# Patient Record
Sex: MALE | Race: WHITE | Employment: OTHER | URBAN - METROPOLITAN AREA
[De-identification: names, ages, dates, MRNs, and addresses within clinical notes are randomized per-mention and may not be internally consistent; named-entity substitution may affect disease eponyms.]

---

## 2017-04-18 ENCOUNTER — APPOINTMENT (OUTPATIENT)
Dept: GENERAL RADIOLOGY | Age: 72
End: 2017-04-18
Attending: EMERGENCY MEDICINE
Payer: MEDICARE

## 2017-04-18 ENCOUNTER — HOSPITAL ENCOUNTER (EMERGENCY)
Age: 72
Discharge: HOME | End: 2017-04-18
Attending: EMERGENCY MEDICINE
Payer: MEDICARE

## 2017-04-18 VITALS
DIASTOLIC BLOOD PRESSURE: 63 MMHG | WEIGHT: 315 LBS | SYSTOLIC BLOOD PRESSURE: 125 MMHG | TEMPERATURE: 98 F | HEART RATE: 77 BPM | OXYGEN SATURATION: 97 % | HEIGHT: 74 IN | BODY MASS INDEX: 40.43 KG/M2 | RESPIRATION RATE: 16 BRPM

## 2017-04-18 DIAGNOSIS — M25.511 PAIN OF BOTH SHOULDER JOINTS: Primary | ICD-10-CM

## 2017-04-18 DIAGNOSIS — M54.2 NECK PAIN: ICD-10-CM

## 2017-04-18 DIAGNOSIS — M25.512 PAIN OF BOTH SHOULDER JOINTS: Primary | ICD-10-CM

## 2017-04-18 LAB
ALBUMIN SERPL BCP-MCNC: 3.4 G/DL (ref 3.2–4.6)
ALBUMIN/GLOB SERPL: 0.9 {RATIO} (ref 1.2–3.5)
ALP SERPL-CCNC: 73 U/L (ref 50–136)
ALT SERPL-CCNC: 19 U/L (ref 12–65)
ANION GAP BLD CALC-SCNC: 8 MMOL/L (ref 7–16)
AST SERPL W P-5'-P-CCNC: 20 U/L (ref 15–37)
ATRIAL RATE: 79 BPM
BASOPHILS # BLD AUTO: 0 K/UL (ref 0–0.2)
BASOPHILS # BLD: 0 % (ref 0–2)
BILIRUB SERPL-MCNC: 0.7 MG/DL (ref 0.2–1.1)
BUN SERPL-MCNC: 9 MG/DL (ref 8–23)
CALCIUM SERPL-MCNC: 9 MG/DL (ref 8.3–10.4)
CALCULATED P AXIS, ECG09: 33 DEGREES
CALCULATED R AXIS, ECG10: 40 DEGREES
CALCULATED T AXIS, ECG11: 69 DEGREES
CHLORIDE SERPL-SCNC: 103 MMOL/L (ref 98–107)
CO2 SERPL-SCNC: 28 MMOL/L (ref 21–32)
CREAT SERPL-MCNC: 0.77 MG/DL (ref 0.8–1.5)
DIAGNOSIS, 93000: NORMAL
DIFFERENTIAL METHOD BLD: ABNORMAL
EOSINOPHIL # BLD: 0.1 K/UL (ref 0–0.8)
EOSINOPHIL NFR BLD: 1 % (ref 0.5–7.8)
ERYTHROCYTE [DISTWIDTH] IN BLOOD BY AUTOMATED COUNT: 13.2 % (ref 11.9–14.6)
GLOBULIN SER CALC-MCNC: 4 G/DL (ref 2.3–3.5)
GLUCOSE SERPL-MCNC: 115 MG/DL (ref 65–100)
HCT VFR BLD AUTO: 42.8 % (ref 41.1–50.3)
HGB BLD-MCNC: 14.5 G/DL (ref 13.6–17.2)
IMM GRANULOCYTES # BLD: 0 K/UL (ref 0–0.5)
IMM GRANULOCYTES NFR BLD AUTO: 0.2 % (ref 0–5)
LYMPHOCYTES # BLD AUTO: 19 % (ref 13–44)
LYMPHOCYTES # BLD: 2 K/UL (ref 0.5–4.6)
MCH RBC QN AUTO: 29.2 PG (ref 26.1–32.9)
MCHC RBC AUTO-ENTMCNC: 33.9 G/DL (ref 31.4–35)
MCV RBC AUTO: 86.1 FL (ref 79.6–97.8)
MONOCYTES # BLD: 1 K/UL (ref 0.1–1.3)
MONOCYTES NFR BLD AUTO: 10 % (ref 4–12)
NEUTS SEG # BLD: 7.2 K/UL (ref 1.7–8.2)
NEUTS SEG NFR BLD AUTO: 70 % (ref 43–78)
P-R INTERVAL, ECG05: 256 MS
PLATELET # BLD AUTO: 186 K/UL (ref 150–450)
PMV BLD AUTO: 9.2 FL (ref 10.8–14.1)
POTASSIUM SERPL-SCNC: 4 MMOL/L (ref 3.5–5.1)
PROT SERPL-MCNC: 7.4 G/DL (ref 6.3–8.2)
Q-T INTERVAL, ECG07: 358 MS
QRS DURATION, ECG06: 76 MS
QTC CALCULATION (BEZET), ECG08: 410 MS
RBC # BLD AUTO: 4.97 M/UL (ref 4.23–5.67)
SODIUM SERPL-SCNC: 139 MMOL/L (ref 136–145)
TROPONIN I SERPL-MCNC: <0.02 NG/ML (ref 0.02–0.05)
VENTRICULAR RATE, ECG03: 79 BPM
WBC # BLD AUTO: 10.3 K/UL (ref 4.3–11.1)

## 2017-04-18 PROCEDURE — 96375 TX/PRO/DX INJ NEW DRUG ADDON: CPT | Performed by: EMERGENCY MEDICINE

## 2017-04-18 PROCEDURE — 96374 THER/PROPH/DIAG INJ IV PUSH: CPT | Performed by: EMERGENCY MEDICINE

## 2017-04-18 PROCEDURE — 74011250636 HC RX REV CODE- 250/636: Performed by: EMERGENCY MEDICINE

## 2017-04-18 PROCEDURE — 84484 ASSAY OF TROPONIN QUANT: CPT | Performed by: EMERGENCY MEDICINE

## 2017-04-18 PROCEDURE — 85025 COMPLETE CBC W/AUTO DIFF WBC: CPT | Performed by: EMERGENCY MEDICINE

## 2017-04-18 PROCEDURE — 99284 EMERGENCY DEPT VISIT MOD MDM: CPT | Performed by: EMERGENCY MEDICINE

## 2017-04-18 PROCEDURE — 73030 X-RAY EXAM OF SHOULDER: CPT

## 2017-04-18 PROCEDURE — 93005 ELECTROCARDIOGRAM TRACING: CPT | Performed by: EMERGENCY MEDICINE

## 2017-04-18 PROCEDURE — 80053 COMPREHEN METABOLIC PANEL: CPT | Performed by: EMERGENCY MEDICINE

## 2017-04-18 PROCEDURE — 71020 XR CHEST PA LAT: CPT

## 2017-04-18 PROCEDURE — 72040 X-RAY EXAM NECK SPINE 2-3 VW: CPT

## 2017-04-18 RX ORDER — HYDROMORPHONE HYDROCHLORIDE 1 MG/ML
1 INJECTION, SOLUTION INTRAMUSCULAR; INTRAVENOUS; SUBCUTANEOUS
Status: COMPLETED | OUTPATIENT
Start: 2017-04-18 | End: 2017-04-18

## 2017-04-18 RX ORDER — METHYLPREDNISOLONE 4 MG/1
TABLET ORAL
Qty: 1 DOSE PACK | Refills: 0 | Status: SHIPPED | OUTPATIENT
Start: 2017-04-18 | End: 2017-09-15

## 2017-04-18 RX ORDER — ONDANSETRON 2 MG/ML
4 INJECTION INTRAMUSCULAR; INTRAVENOUS
Status: COMPLETED | OUTPATIENT
Start: 2017-04-18 | End: 2017-04-18

## 2017-04-18 RX ADMIN — HYDROMORPHONE HYDROCHLORIDE 1 MG: 1 INJECTION, SOLUTION INTRAMUSCULAR; INTRAVENOUS; SUBCUTANEOUS at 12:11

## 2017-04-18 RX ADMIN — ONDANSETRON 4 MG: 2 INJECTION INTRAMUSCULAR; INTRAVENOUS at 12:11

## 2017-04-18 NOTE — ED NOTES
Patient with c/o uncontrolled arthritis pain affecting right shoulder and into right side of neck. Patient reports Hx of injury x2 to right shoulder without open surgical intervention.

## 2017-04-18 NOTE — ED TRIAGE NOTES
Pt reports that he fell Saturday on the cement. Pt states pain to his chest so bad that he almost passed out. Pt states the pain in his chest is gone now.  Pt has extensive heart hx

## 2017-04-18 NOTE — ED PROVIDER NOTES
HPI Comments: Patient complains of severe uncontrolled arthritic pain affecting his right shoulder and right side of the neck. He is currently followed by pain management for not treating his pain. He also notes a fall on Saturday with an injury to his chest hurt a great deal at the time but is now almost gone. Patient is a 67 y.o. male presenting with shoulder pain. The history is provided by the patient and a friend. Shoulder Pain    The incident occurred more than 1 week ago. There was no injury mechanism. Both shoulders are affected. The pain is moderate. The pain has been constant since onset. The pain does not radiate. There is no history of shoulder injury. He has no other injuries. There is no history of shoulder surgery. Pertinent negatives include no numbness, no muscle weakness and no tingling.         Past Medical History:   Diagnosis Date    Anxiety     managed with medication     Arthritis     generalized osteo, managed with medication     Borderline diabetes     lost 100 Lb with no further problems    CAD (coronary artery disease) 2004    states light heart attack    Chronic pain     BACK    COPD     managed with inhalers    Diverticulitis     managed with diet     History of shingles     right side years ago    Hyperlipidemia     managed with medication    Hypertension     managed with medication     Hypertrophy of prostate with urinary obstruction and other lower urinary tract symptoms (LUTS)     Impotence of organic origin     Morbid obesity with BMI of 40.0-44.9, adult ([facility])     BMI 42.2    Nausea & vomiting     after anesthesia    Stenosis of lumbosacral spine     Unspecified adverse effect of anesthesia     difficult to go under anesthesia       Past Surgical History:   Procedure Laterality Date    HX APPENDECTOMY      HX CATARACT REMOVAL Bilateral     with implants    HX COLONOSCOPY      HX FEMUR FRACTURE TX Left     wendi inserted but later removed    HX LUMBAR LAMINECTOMY      HX UROLOGICAL      penile implant    HX UROLOGICAL      penile implant removal    TOTAL KNEE ARTHROPLASTY Bilateral          Family History:   Problem Relation Age of Onset    Cancer Father     Lung Disease Father     Cancer Sister     Heart Disease Mother     Lung Disease Mother     Lung Disease Brother     Lung Disease Sister     Cancer Other     Heart Disease Other      gen fam HX    Hypertension Other      gen fam HX    High Cholesterol Other      gen fam HX       Social History     Social History    Marital status:      Spouse name: N/A    Number of children: N/A    Years of education: N/A     Occupational History    Not on file. Social History Main Topics    Smoking status: Former Smoker     Packs/day: 2.00     Years: 40.00    Smokeless tobacco: Former User      Comment: stopped in [address] Alcohol use No    Drug use: No    Sexual activity: Not on file     Other Topics Concern    Not on file     Social History Narrative         ALLERGIES: Review of patient's allergies indicates no known allergies. Review of Systems   Constitutional: Negative for chills and fever. Cardiovascular: Positive for chest pain. Musculoskeletal: Positive for arthralgias and neck pain. Negative for back pain, myalgias and neck stiffness. Neurological: Negative for tingling and numbness. All other systems reviewed and are negative. Vitals:    04/18/17 1001 04/18/17 1200   BP: 145/74 (!) 163/107   Pulse: 84 72   Resp: 18    Temp: 97.8 °F (36.6 °C)    SpO2: 95% 95%   Weight: 158.8 kg (350 lb)    Height: 6' 2\" (1.88 m)             Physical Exam   Constitutional: He is oriented to person, place, and time. He appears well-developed and well-nourished. No distress. HENT:   Head: Normocephalic and atraumatic.    Right Ear: Tympanic membrane and external ear normal.   Left Ear: Tympanic membrane and external ear normal.   Mouth/Throat: Oropharynx is clear and moist.   Eyes: Conjunctivae and EOM are normal. Pupils are equal, round, and reactive to light. Neck: Trachea normal, normal range of motion and phonation normal. Neck supple. Muscular tenderness present. No spinous process tenderness present. No tracheal deviation and normal range of motion present. Cardiovascular: Normal rate, regular rhythm, normal heart sounds and intact distal pulses. Exam reveals no gallop and no friction rub. No murmur heard. Pulmonary/Chest: Effort normal and breath sounds normal. No respiratory distress. He has no wheezes. Abdominal: Soft. Bowel sounds are normal. He exhibits no distension and no mass. There is no hepatosplenomegaly. There is no tenderness. There is no rebound and no guarding. Musculoskeletal: He exhibits no edema. Right shoulder: He exhibits decreased range of motion, tenderness, crepitus and pain. He exhibits no swelling, no effusion, no deformity, no spasm, normal pulse and normal strength. Left shoulder: He exhibits tenderness, crepitus and pain. He exhibits normal range of motion, no swelling, no effusion, no deformity, no spasm, normal pulse and normal strength. Lymphadenopathy:     He has no cervical adenopathy. Neurological: He is alert and oriented to person, place, and time. He has normal strength. He displays normal reflexes. No cranial nerve deficit or sensory deficit. Coordination normal.   Skin: Skin is warm and dry. No rash noted. He is not diaphoretic. No erythema. Psychiatric: He has a normal mood and affect. Nursing note and vitals reviewed.        MDM  Number of Diagnoses or Management Options  Neck pain: established and worsening  Pain of both shoulder joints: established and worsening     Amount and/or Complexity of Data Reviewed  Clinical lab tests: ordered and reviewed  Tests in the radiology section of CPT®: ordered and reviewed  Decide to obtain previous medical records or to obtain history from someone other than the patient: yes  Review and summarize past medical records: yes  Independent visualization of images, tracings, or specimens: yes    Risk of Complications, Morbidity, and/or Mortality  Presenting problems: high  Diagnostic procedures: moderate  Management options: moderate    Patient Progress  Patient progress: stable    ED Course       Procedures    The patient was observed in the ED. Results Reviewed:      Recent Results (from the past 24 hour(s))   EKG, 12 LEAD, INITIAL    Collection Time: 04/18/17 10:02 AM   Result Value Ref Range    Ventricular Rate 79 BPM    Atrial Rate 79 BPM    P-R Interval 256 ms    QRS Duration 76 ms    Q-T Interval 358 ms    QTC Calculation (Bezet) 410 ms    Calculated P Axis 33 degrees    Calculated R Axis 40 degrees    Calculated T Axis 69 degrees    Diagnosis       Sinus rhythm with 1st degree A-V block  Possible Anterior infarct , age undetermined  Abnormal ECG  When compared with ECG of 08-JUL-2002 15:50,  WV interval has increased  Borderline criteria for Anterior infarct are now Present  Confirmed by THERESA TSE (), Lucas Mandujano ([id number]) on 4/18/2017 12:17:08 PM     TROPONIN I    Collection Time: 04/18/17 10:07 AM   Result Value Ref Range    Troponin-I, Qt. <0.02 (L) 0.02 - 5.63 NG/ML   METABOLIC PANEL, COMPREHENSIVE    Collection Time: 04/18/17 10:07 AM   Result Value Ref Range    Sodium 139 136 - 145 mmol/L    Potassium 4.0 3.5 - 5.1 mmol/L    Chloride 103 98 - 107 mmol/L    CO2 28 21 - 32 mmol/L    Anion gap 8 7 - 16 mmol/L    Glucose 115 (H) 65 - 100 mg/dL    BUN 9 8 - 23 MG/DL    Creatinine 0.77 (L) 0.8 - 1.5 MG/DL    GFR est AA >60 >60 ml/min/1.73m2    GFR est non-AA >60 >60 ml/min/1.73m2    Calcium 9.0 8.3 - 10.4 MG/DL    Bilirubin, total 0.7 0.2 - 1.1 MG/DL    ALT (SGPT) 19 12 - 65 U/L    AST (SGOT) 20 15 - 37 U/L    Alk.  phosphatase 73 50 - 136 U/L    Protein, total 7.4 6.3 - 8.2 g/dL    Albumin 3.4 3.2 - 4.6 g/dL    Globulin 4.0 (H) 2.3 - 3.5 g/dL    A-G Ratio 0.9 (L) 1.2 - 3.5     CBC WITH AUTOMATED DIFF    Collection Time: 04/18/17 10:07 AM   Result Value Ref Range    WBC 10.3 4.3 - 11.1 K/uL    RBC 4.97 4.23 - 5.67 M/uL    HGB 14.5 13.6 - 17.2 g/dL    HCT 42.8 41.1 - 50.3 %    MCV 86.1 79.6 - 97.8 FL    MCH 29.2 26.1 - 32.9 PG    MCHC 33.9 31.4 - 35.0 g/dL    RDW 13.2 11.9 - 14.6 %    PLATELET 794 113 - 974 K/uL    MPV 9.2 (L) 10.8 - 14.1 FL    DF AUTOMATED      NEUTROPHILS 70 43 - 78 %    LYMPHOCYTES 19 13 - 44 %    MONOCYTES 10 4.0 - 12.0 %    EOSINOPHILS 1 0.5 - 7.8 %    BASOPHILS 0 0.0 - 2.0 %    IMMATURE GRANULOCYTES 0.2 0.0 - 5.0 %    ABS. NEUTROPHILS 7.2 1.7 - 8.2 K/UL    ABS. LYMPHOCYTES 2.0 0.5 - 4.6 K/UL    ABS. MONOCYTES 1.0 0.1 - 1.3 K/UL    ABS. EOSINOPHILS 0.1 0.0 - 0.8 K/UL    ABS. BASOPHILS 0.0 0.0 - 0.2 K/UL    ABS. IMM. GRANS. 0.0 0.0 - 0.5 K/UL     XR CHEST PA LAT    (Results Pending)   XR SPINE CERV PA LAT ODONT 3 V MAX    (Results Pending)   XR SHOULDER RT AP/LAT MIN 2 V    (Results Pending)     Preliminary x-ray findings reveal severe DJD with no obvious evidence of fracture or acute process  I discussed the results of all labs, procedures, radiographs, and treatments with the patient and available family. Treatment plan is agreed upon and the patient is ready for discharge. All voiced understanding of the discharge plan and medication instructions or changes as appropriate. Questions about treatment in the ED were answered. All were encouraged to return should symptoms worsen or new problems develop.

## 2017-04-18 NOTE — DISCHARGE INSTRUCTIONS
Shoulder Pain: Care Instructions  Your Care Instructions    You can hurt your shoulder by using it too much during an activity, such as fishing or baseball. It can also happen as part of the everyday wear and tear of getting older. Shoulder injuries can be slow to heal, but your shoulder should get better with time. Your doctor may recommend a sling to rest your shoulder. If you have injured your shoulder, you may need testing and treatment. Follow-up care is a key part of your treatment and safety. Be sure to make and go to all appointments, and call your doctor if you are having problems. It's also a good idea to know your test results and keep a list of the medicines you take. How can you care for yourself at home? · Take pain medicines exactly as directed. ¨ If the doctor gave you a prescription medicine for pain, take it as prescribed. ¨ If you are not taking a prescription pain medicine, ask your doctor if you can take an over-the-counter medicine. ¨ Do not take two or more pain medicines at the same time unless the doctor told you to. Many pain medicines contain acetaminophen, which is Tylenol. Too much acetaminophen (Tylenol) can be harmful. · If your doctor recommends that you wear a sling, use it as directed. Do not take it off before your doctor tells you to. · Put ice or a cold pack on the sore area for 10 to 20 minutes at a time. Put a thin cloth between the ice and your skin. · If there is no swelling, you can put moist heat, a heating pad, or a warm cloth on your shoulder. Some doctors suggest alternating between hot and cold. · Rest your shoulder for a few days. If your doctor recommends it, you can then begin gentle exercise of the shoulder, but do not lift anything heavy. When should you call for help? Call 911 anytime you think you may need emergency care. For example, call if:  · You have chest pain or pressure. This may occur with:  ¨ Sweating. ¨ Shortness of breath.   ¨ Nausea or vomiting. ¨ Pain that spreads from the chest to the neck, jaw, or one or both shoulders or arms. ¨ Dizziness or lightheadedness. ¨ A fast or uneven pulse. After calling 911, chew 1 adult-strength aspirin. Wait for an ambulance. Do not try to drive yourself. · Your arm or hand is cool or pale or changes color. Call your doctor now or seek immediate medical care if:  · You have signs of infection, such as:  ¨ Increased pain, swelling, warmth, or redness in your shoulder. ¨ Red streaks leading from a place on your shoulder. ¨ Pus draining from an area of your shoulder. ¨ Swollen lymph nodes in your neck, armpits, or groin. ¨ A fever. Watch closely for changes in your health, and be sure to contact your doctor if:  · You cannot use your shoulder. · Your shoulder does not get better as expected. Where can you learn more? Go to http://matthewJaspersoftmaame.info/. Enter I803 in the search box to learn more about \"Shoulder Pain: Care Instructions. \"  Current as of: May 23, 2016  Content Version: 11.2  © 9084-8013 [employer]. Care instructions adapted under license by [employer] (which disclaims liability or warranty for this information). If you have questions about a medical condition or this instruction, always ask your healthcare professional. [address] any warranty or liability for your use of this information. Shoulder Stretches: Exercises  Your Care Instructions  Here are some examples of exercises for your shoulder. Start each exercise slowly. Ease off the exercise if you start to have pain. Your doctor or physical therapist will tell you when you can start these exercises and which ones will work best for you. How to do the exercises  Note: These exercises should cause you to feel a gentle stretch, but no pain. Shoulder stretch    1.  a doorway and place one arm against the door frame.  Your elbow should be a little higher than your shoulder. 2. Relax your shoulders as you lean forward, allowing your chest and shoulder muscles to stretch. You can also turn your body slightly away from your arm to stretch the muscles even more. 3. Hold for 15 to 30 seconds. 4. Repeat 2 to 4 times with each arm. Shoulder and chest stretch    Shoulder and chest stretch  1. While sitting, relax your upper body so you slump slightly in your chair. 2. As you breathe in, straighten your back and open your arms out to the sides. 3. Gently pull your shoulder blades back and downward. 4. Hold for 15 to 30 seconds as your breathe normally. 5. Repeat 2 to 4 times. Overhead stretch    1. Reach up over your head with both arms. 2. Hold for 15 to 30 seconds. 3. Repeat 2 to 4 times. Follow-up care is a key part of your treatment and safety. Be sure to make and go to all appointments, and call your doctor if you are having problems. It's also a good idea to know your test results and keep a list of the medicines you take. Where can you learn more? Go to http://matthew-maame.info/. Enter S254 in the search box to learn more about \"Shoulder Stretches: Exercises. \"  Current as of: May 23, 2016  Content Version: 11.2  © 1314-5902 [employer]. Care instructions adapted under license by [employer] (which disclaims liability or warranty for this information). If you have questions about a medical condition or this instruction, always ask your healthcare professional. [place] [address] any warranty or liability for your use of this information.

## 2018-03-05 ENCOUNTER — HOSPITAL ENCOUNTER (OUTPATIENT)
Dept: ULTRASOUND IMAGING | Age: 73
Discharge: HOME | End: 2018-03-05
Attending: UROLOGY
Payer: MEDICARE

## 2018-03-05 DIAGNOSIS — N28.89 LEFT RENAL MASS: ICD-10-CM

## 2018-03-05 PROCEDURE — 76770 US EXAM ABDO BACK WALL COMP: CPT

## 2018-04-12 ENCOUNTER — HOSPITAL ENCOUNTER (OUTPATIENT)
Dept: CT IMAGING | Age: 73
Discharge: HOME | End: 2018-04-12
Attending: UROLOGY
Payer: MEDICARE

## 2018-04-12 DIAGNOSIS — N28.89 RENAL MASS: ICD-10-CM

## 2018-04-12 DIAGNOSIS — C64.2 MALIGNANT NEOPLASM OF LEFT KIDNEY, EXCEPT RENAL PELVIS (HCC): ICD-10-CM

## 2018-04-12 PROCEDURE — 74011636320 HC RX REV CODE- 636/320: Performed by: UROLOGY

## 2018-04-12 PROCEDURE — 74177 CT ABD & PELVIS W/CONTRAST: CPT

## 2018-04-12 PROCEDURE — 74011000258 HC RX REV CODE- 258: Performed by: UROLOGY

## 2018-04-12 RX ORDER — SODIUM CHLORIDE 0.9 % (FLUSH) 0.9 %
10 SYRINGE (ML) INJECTION
Status: COMPLETED | OUTPATIENT
Start: 2018-04-12 | End: 2018-04-12

## 2018-04-12 RX ADMIN — SODIUM CHLORIDE 100 ML: 900 INJECTION, SOLUTION INTRAVENOUS at 16:16

## 2018-04-12 RX ADMIN — DIATRIZOATE MEGLUMINE AND DIATRIZOATE SODIUM 15 ML: 660; 100 LIQUID ORAL; RECTAL at 16:16

## 2018-04-12 RX ADMIN — IOPAMIDOL 100 ML: 755 INJECTION, SOLUTION INTRAVENOUS at 16:16

## 2018-04-12 RX ADMIN — Medication 10 ML: at 16:16

## 2018-04-30 ENCOUNTER — HOSPITAL ENCOUNTER (OUTPATIENT)
Dept: SURGERY | Age: 73
Discharge: HOME | End: 2018-04-30
Payer: MEDICARE

## 2018-04-30 VITALS
DIASTOLIC BLOOD PRESSURE: 93 MMHG | TEMPERATURE: 97.7 F | OXYGEN SATURATION: 90 % | RESPIRATION RATE: 20 BRPM | HEIGHT: 74 IN | WEIGHT: 315 LBS | HEART RATE: 68 BPM | BODY MASS INDEX: 40.43 KG/M2 | SYSTOLIC BLOOD PRESSURE: 150 MMHG

## 2018-04-30 LAB
ANION GAP SERPL CALC-SCNC: 5 MMOL/L (ref 7–16)
BUN SERPL-MCNC: 8 MG/DL (ref 8–23)
CALCIUM SERPL-MCNC: 9.1 MG/DL (ref 8.3–10.4)
CHLORIDE SERPL-SCNC: 105 MMOL/L (ref 98–107)
CO2 SERPL-SCNC: 31 MMOL/L (ref 21–32)
CREAT SERPL-MCNC: 0.74 MG/DL (ref 0.8–1.5)
ERYTHROCYTE [DISTWIDTH] IN BLOOD BY AUTOMATED COUNT: 13.8 % (ref 11.9–14.6)
GLUCOSE SERPL-MCNC: 102 MG/DL (ref 65–100)
HCT VFR BLD AUTO: 44 % (ref 41.1–50.3)
HGB BLD-MCNC: 15.1 G/DL (ref 13.6–17.2)
MCH RBC QN AUTO: 30.6 PG (ref 26.1–32.9)
MCHC RBC AUTO-ENTMCNC: 34.3 G/DL (ref 31.4–35)
MCV RBC AUTO: 89.2 FL (ref 79.6–97.8)
PLATELET # BLD AUTO: 189 K/UL (ref 150–450)
PMV BLD AUTO: 8.9 FL (ref 10.8–14.1)
POTASSIUM SERPL-SCNC: 4.9 MMOL/L (ref 3.5–5.1)
RBC # BLD AUTO: 4.93 M/UL (ref 4.23–5.67)
SODIUM SERPL-SCNC: 141 MMOL/L (ref 136–145)
WBC # BLD AUTO: 5.6 K/UL (ref 4.3–11.1)

## 2018-04-30 PROCEDURE — 80048 BASIC METABOLIC PNL TOTAL CA: CPT | Performed by: UROLOGY

## 2018-04-30 PROCEDURE — 85027 COMPLETE CBC AUTOMATED: CPT | Performed by: UROLOGY

## 2018-04-30 RX ORDER — CEFAZOLIN SODIUM/WATER 2 G/20 ML
2 SYRINGE (ML) INTRAVENOUS ONCE
Status: CANCELLED | OUTPATIENT
Start: 2018-05-07 | End: 2018-05-07

## 2018-04-30 RX ORDER — ALBUTEROL SULFATE 90 UG/1
1 AEROSOL, METERED RESPIRATORY (INHALATION)
COMMUNITY

## 2018-04-30 RX ORDER — DEXTROMETHORPHAN HYDROBROMIDE, GUAIFENESIN 5; 100 MG/5ML; MG/5ML
650 LIQUID ORAL EVERY 8 HOURS
COMMUNITY

## 2018-04-30 RX ORDER — SODIUM CHLORIDE 9 MG/ML
250 INJECTION, SOLUTION INTRAVENOUS AS NEEDED
Status: CANCELLED | OUTPATIENT
Start: 2018-04-30

## 2018-04-30 NOTE — PERIOP NOTES
EKG tracing 3/21/18 not yet received from [facility]. EKG interpretation in \"care everywhere\" 3/21/18 reads Normal Sinus Rhythm, first degree AV block, PVC noted. Re-faxed release and received second confirmation. Spoke with Griffin Washington at [facility], requested EKG tracing to be faxed to [phone]. Per Griffin Washington, Dr Jazzy León must approve before EKG is released. Dr Lennox Means reviewed EKG 4/18/17 and EKG interpretation 3/21/18 and approved. Will continue to pursue EKG tracing. Chart flagged for charge nurse.

## 2018-04-30 NOTE — PERIOP NOTES
Type 3 surgery,  assessment complete. Pt is here with his friend Elvie Gabriel. Pt is hard of hearing and has no family in the state. Labs per surgeon: CBC, BMP today and Pt and friend Dax Navarro understanding pt must return to main lab Sunday 5/6/18 between 8 am and noon for blood bank labs. Pt verbalizes understanding not to remove green blood bank wrist band. Labs per anesthesia protocol: none  EKG: requested EKG/faxed release to primary care dated as 3/21/18. Awaiting response. Pt states hx of murmur at birth and dx again ~2013. Murmur slightly audible on R sternal border with pt leaning forward. Pt states last ECHO ~2013 with dx. Dr Poli Rodríguez gave telephone order for ECHO, however after in to assess states \"no echo required. \"    Hibiclens and instructions given per hospital policy. Patient provided with and instructed on educational handouts including Guide to Surgery, Pain Management, Hand Hygiene, Blood Transfusion Education, and [place] [facility] Brochure. Patient answered medical/surgical history questions at their best of ability. All prior to admission medications documented in [facility]. Patient instructed to hold all vitamins 7 days prior to surgery and NSAIDS 5 days prior to surgery, patient verbalized understanding. Medications to be held: Advil pm    Patient instructed to continue previous medications as prescribed prior to surgery and to take the following medications the day of surgery according to anesthesia guidelines with a small sip of water: finasteride, oxybutynin and Pt verbalizes understanding to bring Proair inhaler. Patient instructed on the following:  Arrive at main Entrance, time of arrival to be called the day before by 1700  Pt has copy of bowel prep instructions from surgeon and verbalizes understanding. Responsible adult must drive patient to the hospital, stay during surgery, and patient will require supervision 24 hours after anesthesia.   Use hibiclens in shower the night before surgery and on the morning of surgery. Leave all valuables (money and jewelry) at home but bring insurance card and ID on DOS. Do not wear make-up, nail polish, lotions, cologne, perfumes, powders, or oil on skin. Patient teach back successful and patient demonstrates knowledge of instruction.

## 2018-05-06 ENCOUNTER — HOSPITAL ENCOUNTER (OUTPATIENT)
Dept: LAB | Age: 73
Discharge: HOME | DRG: 657 | End: 2018-05-06
Payer: MEDICARE

## 2018-05-06 ENCOUNTER — ANESTHESIA EVENT (OUTPATIENT)
Dept: SURGERY | Age: 73
DRG: 657 | End: 2018-05-06
Payer: MEDICARE

## 2018-05-06 PROCEDURE — 86850 RBC ANTIBODY SCREEN: CPT | Performed by: UROLOGY

## 2018-05-06 PROCEDURE — 36415 COLL VENOUS BLD VENIPUNCTURE: CPT | Performed by: UROLOGY

## 2018-05-06 PROCEDURE — 86920 COMPATIBILITY TEST SPIN: CPT | Performed by: UROLOGY

## 2018-05-07 ENCOUNTER — ANESTHESIA (OUTPATIENT)
Dept: SURGERY | Age: 73
DRG: 657 | End: 2018-05-07
Payer: MEDICARE

## 2018-05-07 ENCOUNTER — HOSPITAL ENCOUNTER (INPATIENT)
Age: 73
LOS: 5 days | Discharge: NURSING, REHAB OR LONG-TERM CARE | DRG: 657 | End: 2018-05-12
Attending: UROLOGY | Admitting: UROLOGY
Payer: MEDICARE

## 2018-05-07 DIAGNOSIS — C64.2 CARCINOMA, RENAL CELL, LEFT (HCC): Primary | ICD-10-CM

## 2018-05-07 PROBLEM — N28.89 LEFT RENAL MASS: Status: ACTIVE | Noted: 2018-05-07

## 2018-05-07 LAB — GLUCOSE BLD STRIP.AUTO-MCNC: 119 MG/DL (ref 65–100)

## 2018-05-07 PROCEDURE — 82962 GLUCOSE BLOOD TEST: CPT

## 2018-05-07 PROCEDURE — 77030034154 HC SHR COAG HARM ACE J&J -F: Performed by: UROLOGY

## 2018-05-07 PROCEDURE — 77030035051: Performed by: UROLOGY

## 2018-05-07 PROCEDURE — 74011250637 HC RX REV CODE- 250/637: Performed by: ANESTHESIOLOGY

## 2018-05-07 PROCEDURE — 74011000250 HC RX REV CODE- 250

## 2018-05-07 PROCEDURE — 76010000177 HC OR TIME 5 TO 5.5 HR INTENSV-TIER 1: Performed by: UROLOGY

## 2018-05-07 PROCEDURE — 77030031139 HC SUT VCRL2 J&J -A: Performed by: UROLOGY

## 2018-05-07 PROCEDURE — 77030008522 HC TBNG INSUF LAPRO STRY -B: Performed by: UROLOGY

## 2018-05-07 PROCEDURE — 77030020268 HC MISC GENERAL SUPPLY: Performed by: UROLOGY

## 2018-05-07 PROCEDURE — 77030035045 HC TRCR ENDOSC VRSPRT BLDLSS COVD -B: Performed by: UROLOGY

## 2018-05-07 PROCEDURE — 74011250636 HC RX REV CODE- 250/636: Performed by: UROLOGY

## 2018-05-07 PROCEDURE — 74011000258 HC RX REV CODE- 258: Performed by: UROLOGY

## 2018-05-07 PROCEDURE — 74011250636 HC RX REV CODE- 250/636

## 2018-05-07 PROCEDURE — 77030022473 HC HNDL ENDO GIA UNIV USDA -C: Performed by: UROLOGY

## 2018-05-07 PROCEDURE — 77030008756 HC TU IRR SUC STRY -B: Performed by: UROLOGY

## 2018-05-07 PROCEDURE — 77030025088 HC APPL CLP LIG 1 COVD -B: Performed by: UROLOGY

## 2018-05-07 PROCEDURE — 76210000016 HC OR PH I REC 1 TO 1.5 HR: Performed by: UROLOGY

## 2018-05-07 PROCEDURE — 74011000250 HC RX REV CODE- 250: Performed by: ANESTHESIOLOGY

## 2018-05-07 PROCEDURE — C1726 CATH, BAL DIL, NON-VASCULAR: HCPCS | Performed by: UROLOGY

## 2018-05-07 PROCEDURE — 74011250636 HC RX REV CODE- 250/636: Performed by: ANESTHESIOLOGY

## 2018-05-07 PROCEDURE — 0TT14ZZ RESECTION OF LEFT KIDNEY, PERCUTANEOUS ENDOSCOPIC APPROACH: ICD-10-PCS | Performed by: UROLOGY

## 2018-05-07 PROCEDURE — 74011250637 HC RX REV CODE- 250/637: Performed by: UROLOGY

## 2018-05-07 PROCEDURE — 77030005518 HC CATH URETH FOL 2W BARD -B: Performed by: UROLOGY

## 2018-05-07 PROCEDURE — 77030011640 HC PAD GRND REM COVD -A: Performed by: UROLOGY

## 2018-05-07 PROCEDURE — 77030008477 HC STYL SATN SLP COVD -A: Performed by: ANESTHESIOLOGY

## 2018-05-07 PROCEDURE — 77030008606 HC TRCR ENDOSC KII AMR -B: Performed by: UROLOGY

## 2018-05-07 PROCEDURE — 77030032490 HC SLV COMPR SCD KNE COVD -B: Performed by: UROLOGY

## 2018-05-07 PROCEDURE — 76060000041 HC ANESTHESIA 5 TO 5.5 HR: Performed by: UROLOGY

## 2018-05-07 PROCEDURE — 77030003575 HC NDL INSUF ENDOPTH J&J -B: Performed by: UROLOGY

## 2018-05-07 PROCEDURE — 77030008603 HC TRCR ENDOSC EPATH J&J -C: Performed by: UROLOGY

## 2018-05-07 PROCEDURE — 77030000038 HC TIP SCIS LAPSCP SURI -B: Performed by: UROLOGY

## 2018-05-07 PROCEDURE — 77030020782 HC GWN BAIR PAWS FLX 3M -B: Performed by: ANESTHESIOLOGY

## 2018-05-07 PROCEDURE — 77030009965 HC RELD STPLR ENDOS COVD -D: Performed by: UROLOGY

## 2018-05-07 PROCEDURE — 65270000029 HC RM PRIVATE

## 2018-05-07 PROCEDURE — 77030019940 HC BLNKT HYPOTHRM STRY -B: Performed by: ANESTHESIOLOGY

## 2018-05-07 PROCEDURE — 77030002966 HC SUT PDS J&J -A: Performed by: UROLOGY

## 2018-05-07 PROCEDURE — 77030019908 HC STETH ESOPH SIMS -A: Performed by: ANESTHESIOLOGY

## 2018-05-07 PROCEDURE — 77030016151 HC PROTCTR LNS DFOG COVD -B: Performed by: UROLOGY

## 2018-05-07 PROCEDURE — 77030034696 HC CATH URETH FOL 2W BARD -A: Performed by: UROLOGY

## 2018-05-07 PROCEDURE — 74011000250 HC RX REV CODE- 250: Performed by: UROLOGY

## 2018-05-07 PROCEDURE — 77030008703 HC TU ET UNCUF COVD -A: Performed by: ANESTHESIOLOGY

## 2018-05-07 PROCEDURE — 77030018836 HC SOL IRR NACL ICUM -A: Performed by: UROLOGY

## 2018-05-07 PROCEDURE — 77030007956 HC PCH ENDOSC SPEC COVD -C: Performed by: UROLOGY

## 2018-05-07 PROCEDURE — 77030022474 HC RELD STPLR ENDO GIA COVD -C: Performed by: UROLOGY

## 2018-05-07 PROCEDURE — 77030008467 HC STPLR SKN COVD -B: Performed by: UROLOGY

## 2018-05-07 PROCEDURE — 88307 TISSUE EXAM BY PATHOLOGIST: CPT | Performed by: UROLOGY

## 2018-05-07 RX ORDER — PROPOFOL 10 MG/ML
INJECTION, EMULSION INTRAVENOUS AS NEEDED
Status: DISCONTINUED | OUTPATIENT
Start: 2018-05-07 | End: 2018-05-07 | Stop reason: HOSPADM

## 2018-05-07 RX ORDER — ONDANSETRON 2 MG/ML
4 INJECTION INTRAMUSCULAR; INTRAVENOUS
Status: DISCONTINUED | OUTPATIENT
Start: 2018-05-07 | End: 2018-05-12 | Stop reason: HOSPADM

## 2018-05-07 RX ORDER — GLYCOPYRROLATE 0.2 MG/ML
INJECTION INTRAMUSCULAR; INTRAVENOUS AS NEEDED
Status: DISCONTINUED | OUTPATIENT
Start: 2018-05-07 | End: 2018-05-07 | Stop reason: HOSPADM

## 2018-05-07 RX ORDER — MIDAZOLAM HYDROCHLORIDE 1 MG/ML
2 INJECTION, SOLUTION INTRAMUSCULAR; INTRAVENOUS
Status: DISCONTINUED | OUTPATIENT
Start: 2018-05-07 | End: 2018-05-07 | Stop reason: HOSPADM

## 2018-05-07 RX ORDER — NALOXONE HYDROCHLORIDE 0.4 MG/ML
0.1 INJECTION, SOLUTION INTRAMUSCULAR; INTRAVENOUS; SUBCUTANEOUS
Status: DISCONTINUED | OUTPATIENT
Start: 2018-05-07 | End: 2018-05-07 | Stop reason: HOSPADM

## 2018-05-07 RX ORDER — HYDROMORPHONE HYDROCHLORIDE 2 MG/ML
1 INJECTION, SOLUTION INTRAMUSCULAR; INTRAVENOUS; SUBCUTANEOUS
Status: DISCONTINUED | OUTPATIENT
Start: 2018-05-07 | End: 2018-05-12 | Stop reason: HOSPADM

## 2018-05-07 RX ORDER — ACETAMINOPHEN 325 MG/1
650 TABLET ORAL
Status: DISCONTINUED | OUTPATIENT
Start: 2018-05-07 | End: 2018-05-12 | Stop reason: HOSPADM

## 2018-05-07 RX ORDER — LIDOCAINE HYDROCHLORIDE 20 MG/ML
INJECTION, SOLUTION EPIDURAL; INFILTRATION; INTRACAUDAL; PERINEURAL AS NEEDED
Status: DISCONTINUED | OUTPATIENT
Start: 2018-05-07 | End: 2018-05-07 | Stop reason: HOSPADM

## 2018-05-07 RX ORDER — DEXTROSE MONOHYDRATE AND SODIUM CHLORIDE 5; .45 G/100ML; G/100ML
75 INJECTION, SOLUTION INTRAVENOUS CONTINUOUS
Status: DISCONTINUED | OUTPATIENT
Start: 2018-05-07 | End: 2018-05-10

## 2018-05-07 RX ORDER — SODIUM CHLORIDE 0.9 % (FLUSH) 0.9 %
5-10 SYRINGE (ML) INJECTION EVERY 8 HOURS
Status: DISCONTINUED | OUTPATIENT
Start: 2018-05-07 | End: 2018-05-07 | Stop reason: HOSPADM

## 2018-05-07 RX ORDER — SODIUM CHLORIDE, SODIUM LACTATE, POTASSIUM CHLORIDE, CALCIUM CHLORIDE 600; 310; 30; 20 MG/100ML; MG/100ML; MG/100ML; MG/100ML
75 INJECTION, SOLUTION INTRAVENOUS CONTINUOUS
Status: DISCONTINUED | OUTPATIENT
Start: 2018-05-07 | End: 2018-05-07 | Stop reason: HOSPADM

## 2018-05-07 RX ORDER — SODIUM CHLORIDE 0.9 % (FLUSH) 0.9 %
5-10 SYRINGE (ML) INJECTION AS NEEDED
Status: DISCONTINUED | OUTPATIENT
Start: 2018-05-07 | End: 2018-05-07 | Stop reason: HOSPADM

## 2018-05-07 RX ORDER — BUPIVACAINE HYDROCHLORIDE AND EPINEPHRINE 5; 5 MG/ML; UG/ML
INJECTION, SOLUTION EPIDURAL; INTRACAUDAL; PERINEURAL AS NEEDED
Status: DISCONTINUED | OUTPATIENT
Start: 2018-05-07 | End: 2018-05-07 | Stop reason: HOSPADM

## 2018-05-07 RX ORDER — SODIUM CHLORIDE 0.9 % (FLUSH) 0.9 %
5-10 SYRINGE (ML) INJECTION AS NEEDED
Status: DISCONTINUED | OUTPATIENT
Start: 2018-05-07 | End: 2018-05-12 | Stop reason: HOSPADM

## 2018-05-07 RX ORDER — GABAPENTIN 300 MG/1
300 CAPSULE ORAL ONCE
Status: COMPLETED | OUTPATIENT
Start: 2018-05-07 | End: 2018-05-07

## 2018-05-07 RX ORDER — OXYCODONE HYDROCHLORIDE 5 MG/1
5 TABLET ORAL
Status: COMPLETED | OUTPATIENT
Start: 2018-05-07 | End: 2018-05-07

## 2018-05-07 RX ORDER — ROCURONIUM BROMIDE 10 MG/ML
INJECTION, SOLUTION INTRAVENOUS AS NEEDED
Status: DISCONTINUED | OUTPATIENT
Start: 2018-05-07 | End: 2018-05-07 | Stop reason: HOSPADM

## 2018-05-07 RX ORDER — POLYVINYL ALCOHOL 14 MG/ML
1 SOLUTION/ DROPS OPHTHALMIC AS NEEDED
Status: DISCONTINUED | OUTPATIENT
Start: 2018-05-07 | End: 2018-05-12 | Stop reason: HOSPADM

## 2018-05-07 RX ORDER — FENTANYL CITRATE 50 UG/ML
INJECTION, SOLUTION INTRAMUSCULAR; INTRAVENOUS AS NEEDED
Status: DISCONTINUED | OUTPATIENT
Start: 2018-05-07 | End: 2018-05-07 | Stop reason: HOSPADM

## 2018-05-07 RX ORDER — NEOSTIGMINE METHYLSULFATE 1 MG/ML
INJECTION INTRAVENOUS AS NEEDED
Status: DISCONTINUED | OUTPATIENT
Start: 2018-05-07 | End: 2018-05-07 | Stop reason: HOSPADM

## 2018-05-07 RX ORDER — NALBUPHINE HYDROCHLORIDE 20 MG/ML
5 INJECTION, SOLUTION INTRAMUSCULAR; INTRAVENOUS; SUBCUTANEOUS
Status: DISCONTINUED | OUTPATIENT
Start: 2018-05-07 | End: 2018-05-07 | Stop reason: HOSPADM

## 2018-05-07 RX ORDER — SUCCINYLCHOLINE CHLORIDE 20 MG/ML
INJECTION INTRAMUSCULAR; INTRAVENOUS AS NEEDED
Status: DISCONTINUED | OUTPATIENT
Start: 2018-05-07 | End: 2018-05-07 | Stop reason: HOSPADM

## 2018-05-07 RX ORDER — FLUMAZENIL 0.1 MG/ML
0.2 INJECTION INTRAVENOUS
Status: DISCONTINUED | OUTPATIENT
Start: 2018-05-07 | End: 2018-05-07 | Stop reason: HOSPADM

## 2018-05-07 RX ORDER — SODIUM CHLORIDE 9 MG/ML
250 INJECTION, SOLUTION INTRAVENOUS AS NEEDED
Status: DISCONTINUED | OUTPATIENT
Start: 2018-05-07 | End: 2018-05-07 | Stop reason: HOSPADM

## 2018-05-07 RX ORDER — SODIUM CHLORIDE 0.9 % (FLUSH) 0.9 %
5-10 SYRINGE (ML) INJECTION EVERY 8 HOURS
Status: DISCONTINUED | OUTPATIENT
Start: 2018-05-07 | End: 2018-05-12 | Stop reason: HOSPADM

## 2018-05-07 RX ORDER — HYDROMORPHONE HYDROCHLORIDE 2 MG/ML
0.5 INJECTION, SOLUTION INTRAMUSCULAR; INTRAVENOUS; SUBCUTANEOUS
Status: COMPLETED | OUTPATIENT
Start: 2018-05-07 | End: 2018-05-07

## 2018-05-07 RX ORDER — KETAMINE HYDROCHLORIDE 100 MG/ML
INJECTION, SOLUTION INTRAMUSCULAR; INTRAVENOUS AS NEEDED
Status: DISCONTINUED | OUTPATIENT
Start: 2018-05-07 | End: 2018-05-07 | Stop reason: HOSPADM

## 2018-05-07 RX ORDER — AMITRIPTYLINE HYDROCHLORIDE 50 MG/1
25 TABLET, FILM COATED ORAL
Status: DISCONTINUED | OUTPATIENT
Start: 2018-05-07 | End: 2018-05-12 | Stop reason: HOSPADM

## 2018-05-07 RX ORDER — SODIUM CHLORIDE, SODIUM LACTATE, POTASSIUM CHLORIDE, CALCIUM CHLORIDE 600; 310; 30; 20 MG/100ML; MG/100ML; MG/100ML; MG/100ML
100 INJECTION, SOLUTION INTRAVENOUS CONTINUOUS
Status: DISCONTINUED | OUTPATIENT
Start: 2018-05-07 | End: 2018-05-07 | Stop reason: HOSPADM

## 2018-05-07 RX ORDER — ALBUTEROL SULFATE 90 UG/1
1 AEROSOL, METERED RESPIRATORY (INHALATION)
Status: DISCONTINUED | OUTPATIENT
Start: 2018-05-07 | End: 2018-05-12 | Stop reason: HOSPADM

## 2018-05-07 RX ORDER — ONDANSETRON 2 MG/ML
INJECTION INTRAMUSCULAR; INTRAVENOUS AS NEEDED
Status: DISCONTINUED | OUTPATIENT
Start: 2018-05-07 | End: 2018-05-07 | Stop reason: HOSPADM

## 2018-05-07 RX ORDER — LIDOCAINE HYDROCHLORIDE 10 MG/ML
0.1 INJECTION INFILTRATION; PERINEURAL AS NEEDED
Status: DISCONTINUED | OUTPATIENT
Start: 2018-05-07 | End: 2018-05-07 | Stop reason: HOSPADM

## 2018-05-07 RX ORDER — FINASTERIDE 5 MG/1
5 TABLET, FILM COATED ORAL DAILY
Status: DISCONTINUED | OUTPATIENT
Start: 2018-05-08 | End: 2018-05-12 | Stop reason: HOSPADM

## 2018-05-07 RX ORDER — DEXAMETHASONE SODIUM PHOSPHATE 4 MG/ML
INJECTION, SOLUTION INTRA-ARTICULAR; INTRALESIONAL; INTRAMUSCULAR; INTRAVENOUS; SOFT TISSUE AS NEEDED
Status: DISCONTINUED | OUTPATIENT
Start: 2018-05-07 | End: 2018-05-07 | Stop reason: HOSPADM

## 2018-05-07 RX ORDER — SODIUM CHLORIDE, SODIUM LACTATE, POTASSIUM CHLORIDE, CALCIUM CHLORIDE 600; 310; 30; 20 MG/100ML; MG/100ML; MG/100ML; MG/100ML
INJECTION, SOLUTION INTRAVENOUS
Status: DISCONTINUED | OUTPATIENT
Start: 2018-05-07 | End: 2018-05-07 | Stop reason: HOSPADM

## 2018-05-07 RX ORDER — HYDROCODONE BITARTRATE AND ACETAMINOPHEN 10; 325 MG/1; MG/1
1 TABLET ORAL
Status: DISCONTINUED | OUTPATIENT
Start: 2018-05-07 | End: 2018-05-12 | Stop reason: HOSPADM

## 2018-05-07 RX ORDER — VECURONIUM BROMIDE FOR INJECTION 1 MG/ML
INJECTION, POWDER, LYOPHILIZED, FOR SOLUTION INTRAVENOUS AS NEEDED
Status: DISCONTINUED | OUTPATIENT
Start: 2018-05-07 | End: 2018-05-07 | Stop reason: HOSPADM

## 2018-05-07 RX ORDER — DOCUSATE SODIUM 100 MG/1
100 CAPSULE, LIQUID FILLED ORAL 2 TIMES DAILY
Status: DISCONTINUED | OUTPATIENT
Start: 2018-05-07 | End: 2018-05-12 | Stop reason: HOSPADM

## 2018-05-07 RX ORDER — ACETAMINOPHEN 10 MG/ML
1000 INJECTION, SOLUTION INTRAVENOUS ONCE
Status: COMPLETED | OUTPATIENT
Start: 2018-05-07 | End: 2018-05-07

## 2018-05-07 RX ORDER — LIDOCAINE HYDROCHLORIDE ANHYDROUS AND DEXTROSE MONOHYDRATE .4; 5 G/100ML; G/100ML
INJECTION, SOLUTION INTRAVENOUS
Status: DISCONTINUED | OUTPATIENT
Start: 2018-05-07 | End: 2018-05-07 | Stop reason: HOSPADM

## 2018-05-07 RX ADMIN — VECURONIUM BROMIDE FOR INJECTION 2 MG: 1 INJECTION, POWDER, LYOPHILIZED, FOR SOLUTION INTRAVENOUS at 09:49

## 2018-05-07 RX ADMIN — KETAMINE HYDROCHLORIDE 50 MG: 100 INJECTION, SOLUTION INTRAMUSCULAR; INTRAVENOUS at 08:29

## 2018-05-07 RX ADMIN — SODIUM CHLORIDE, SODIUM LACTATE, POTASSIUM CHLORIDE, CALCIUM CHLORIDE: 600; 310; 30; 20 INJECTION, SOLUTION INTRAVENOUS at 10:15

## 2018-05-07 RX ADMIN — FAMOTIDINE 20 MG: 10 INJECTION, SOLUTION INTRAVENOUS at 06:59

## 2018-05-07 RX ADMIN — SODIUM CHLORIDE, SODIUM LACTATE, POTASSIUM CHLORIDE, AND CALCIUM CHLORIDE 100 ML/HR: 600; 310; 30; 20 INJECTION, SOLUTION INTRAVENOUS at 06:38

## 2018-05-07 RX ADMIN — Medication 10 ML: at 21:39

## 2018-05-07 RX ADMIN — HYDROMORPHONE HYDROCHLORIDE 0.5 MG: 2 INJECTION, SOLUTION INTRAMUSCULAR; INTRAVENOUS; SUBCUTANEOUS at 13:48

## 2018-05-07 RX ADMIN — FENTANYL CITRATE 25 MCG: 50 INJECTION, SOLUTION INTRAMUSCULAR; INTRAVENOUS at 12:54

## 2018-05-07 RX ADMIN — ROCURONIUM BROMIDE 20 MG: 10 INJECTION, SOLUTION INTRAVENOUS at 08:25

## 2018-05-07 RX ADMIN — ONDANSETRON 4 MG: 2 INJECTION INTRAMUSCULAR; INTRAVENOUS at 12:40

## 2018-05-07 RX ADMIN — SUCCINYLCHOLINE CHLORIDE 200 MG: 20 INJECTION INTRAMUSCULAR; INTRAVENOUS at 07:54

## 2018-05-07 RX ADMIN — HYDROMORPHONE HYDROCHLORIDE 0.5 MG: 2 INJECTION, SOLUTION INTRAMUSCULAR; INTRAVENOUS; SUBCUTANEOUS at 13:39

## 2018-05-07 RX ADMIN — HYDROMORPHONE HYDROCHLORIDE 1 MG: 2 INJECTION, SOLUTION INTRAMUSCULAR; INTRAVENOUS; SUBCUTANEOUS at 22:29

## 2018-05-07 RX ADMIN — Medication 3 G: at 08:13

## 2018-05-07 RX ADMIN — AMITRIPTYLINE HYDROCHLORIDE 25 MG: 50 TABLET, FILM COATED ORAL at 21:31

## 2018-05-07 RX ADMIN — GABAPENTIN 300 MG: 300 CAPSULE ORAL at 07:05

## 2018-05-07 RX ADMIN — LIDOCAINE HYDROCHLORIDE ANHYDROUS AND DEXTROSE MONOHYDRATE 1.5 MG/MIN: .4; 5 INJECTION, SOLUTION INTRAVENOUS at 08:07

## 2018-05-07 RX ADMIN — DOCUSATE SODIUM 100 MG: 100 CAPSULE, LIQUID FILLED ORAL at 16:57

## 2018-05-07 RX ADMIN — FENTANYL CITRATE 25 MCG: 50 INJECTION, SOLUTION INTRAMUSCULAR; INTRAVENOUS at 12:57

## 2018-05-07 RX ADMIN — GLYCOPYRROLATE 0.4 MG: 0.2 INJECTION INTRAMUSCULAR; INTRAVENOUS at 12:54

## 2018-05-07 RX ADMIN — PROPOFOL 120 MG: 10 INJECTION, EMULSION INTRAVENOUS at 07:52

## 2018-05-07 RX ADMIN — FENTANYL CITRATE 100 MCG: 50 INJECTION, SOLUTION INTRAMUSCULAR; INTRAVENOUS at 07:50

## 2018-05-07 RX ADMIN — Medication 3 G: at 12:13

## 2018-05-07 RX ADMIN — OXYCODONE HYDROCHLORIDE 5 MG: 5 TABLET ORAL at 14:04

## 2018-05-07 RX ADMIN — HYDROMORPHONE HYDROCHLORIDE 1 MG: 2 INJECTION, SOLUTION INTRAMUSCULAR; INTRAVENOUS; SUBCUTANEOUS at 18:35

## 2018-05-07 RX ADMIN — Medication 10 ML: at 15:00

## 2018-05-07 RX ADMIN — ROCURONIUM BROMIDE 25 MG: 10 INJECTION, SOLUTION INTRAVENOUS at 08:10

## 2018-05-07 RX ADMIN — ROCURONIUM BROMIDE 5 MG: 10 INJECTION, SOLUTION INTRAVENOUS at 07:52

## 2018-05-07 RX ADMIN — HYDROMORPHONE HYDROCHLORIDE 0.5 MG: 2 INJECTION, SOLUTION INTRAMUSCULAR; INTRAVENOUS; SUBCUTANEOUS at 13:32

## 2018-05-07 RX ADMIN — DEXAMETHASONE SODIUM PHOSPHATE 10 MG: 4 INJECTION, SOLUTION INTRA-ARTICULAR; INTRALESIONAL; INTRAMUSCULAR; INTRAVENOUS; SOFT TISSUE at 08:43

## 2018-05-07 RX ADMIN — VECURONIUM BROMIDE FOR INJECTION 2 MG: 1 INJECTION, POWDER, LYOPHILIZED, FOR SOLUTION INTRAVENOUS at 09:01

## 2018-05-07 RX ADMIN — DEXTROSE MONOHYDRATE AND SODIUM CHLORIDE 100 ML/HR: 5; .45 INJECTION, SOLUTION INTRAVENOUS at 15:00

## 2018-05-07 RX ADMIN — VECURONIUM BROMIDE FOR INJECTION 1 MG: 1 INJECTION, POWDER, LYOPHILIZED, FOR SOLUTION INTRAVENOUS at 12:26

## 2018-05-07 RX ADMIN — VECURONIUM BROMIDE FOR INJECTION 2 MG: 1 INJECTION, POWDER, LYOPHILIZED, FOR SOLUTION INTRAVENOUS at 10:34

## 2018-05-07 RX ADMIN — HYDROMORPHONE HYDROCHLORIDE 0.5 MG: 2 INJECTION, SOLUTION INTRAMUSCULAR; INTRAVENOUS; SUBCUTANEOUS at 13:20

## 2018-05-07 RX ADMIN — HYDROCODONE BITARTRATE AND ACETAMINOPHEN 1 TABLET: 10; 325 TABLET ORAL at 16:56

## 2018-05-07 RX ADMIN — SODIUM CHLORIDE, SODIUM LACTATE, POTASSIUM CHLORIDE, AND CALCIUM CHLORIDE: 600; 310; 30; 20 INJECTION, SOLUTION INTRAVENOUS at 12:11

## 2018-05-07 RX ADMIN — LIDOCAINE HYDROCHLORIDE 100 MG: 20 INJECTION, SOLUTION EPIDURAL; INFILTRATION; INTRACAUDAL; PERINEURAL at 07:51

## 2018-05-07 RX ADMIN — PROPOFOL 50 MG: 10 INJECTION, EMULSION INTRAVENOUS at 07:58

## 2018-05-07 RX ADMIN — NEOSTIGMINE METHYLSULFATE 4 MG: 1 INJECTION INTRAVENOUS at 12:54

## 2018-05-07 RX ADMIN — ACETAMINOPHEN 1000 MG: 10 INJECTION, SOLUTION INTRAVENOUS at 13:15

## 2018-05-07 RX ADMIN — VECURONIUM BROMIDE FOR INJECTION 1 MG: 1 INJECTION, POWDER, LYOPHILIZED, FOR SOLUTION INTRAVENOUS at 11:30

## 2018-05-07 RX ADMIN — FENTANYL CITRATE 50 MCG: 50 INJECTION, SOLUTION INTRAMUSCULAR; INTRAVENOUS at 12:55

## 2018-05-07 RX ADMIN — SODIUM CHLORIDE, SODIUM LACTATE, POTASSIUM CHLORIDE, CALCIUM CHLORIDE: 600; 310; 30; 20 INJECTION, SOLUTION INTRAVENOUS at 08:05

## 2018-05-07 NOTE — BRIEF OP NOTE
BRIEF OPERATIVE NOTE    Date of Procedure: 5/7/2018   Preoperative Diagnosis: Left renal mass [N28.89]  Postoperative Diagnosis: Left renal mass [N28.89]    Procedure(s):  LEFT NEPHRECTOMY LAPAROSCOPIC  Surgeon(s) and Role:     * Reinier Pulido MD - Primary     * Buck Trejo MD - Assisting         Surgical Assistant: Jess Hammans    Surgical Staff:  Circ-1: Leena Shah RN  Circ-Relief: Marilyn Gooden RN  Scrub Tech-1: [place] Gould  Scrub Tech-Relief: Kirstin Garcia  Event Time In   Incision Start 0830   Incision Close 1250     Anesthesia: General   Estimated Blood Loss: 40 ml  Specimens:   ID Type Source Tests Collected by Time Destination   1 : Left Kidney Fresh Kidney  Reinier Pulido MD 5/7/2018 1246 Pathology      Findings: see dictation   Complications: none apparent  Implants: * No implants in log *

## 2018-05-07 NOTE — IP AVS SNAPSHOT
[facility] 
 
 
 145 [facility] 322 [facility] 
[phone] Patient: Conrad Kraft MRN: [id number] FARHAN:[birth year] About your hospitalization You were admitted on: May 7, 2018 You last received care in the:  [facility] 6 MED SURG You were discharged on:  May 12, 2018 Why you were hospitalized Your primary diagnosis was:  Left Renal Mass Your diagnoses also included:  Carcinoma, Renal Cell, Left (Hcc) Follow-up Information Follow up With Details Comments Contact Info Roverto Causey MD    [address] [facility] [address] 
[phone] Terrie Headley MD  Office will call with a follow up appt. [address] [address] 
[phone] Your Scheduled Appointments Friday May 18, 2018  1:30 PM EDT Office Visit with Terrie Headley MD  
[facility] Urology 48 ([address]) 1441 [facility] [address] [place] [address]  
[phone] Discharge Orders None A check anmol indicates which time of day the medication should be taken. My Medications START taking these medications Instructions Each Dose to Equal  
 Morning Noon Evening Bedtime HYDROcodone-acetaminophen  mg tablet Commonly known as:  Gleda Ridgel Take 1 Tab by mouth every six (6) hours as needed. Max Daily Amount: 4 Tabs. 1 Tab CONTINUE taking these medications Instructions Each Dose to Equal  
 Morning Noon Evening Bedtime  
 acetaminophen 650 mg Tber Commonly known as:  TYLENOL Notes to Patient:  Resume home schedule Take 650 mg by mouth every eight (8) hours. 650 mg ADVIL -38 mg Tab Generic drug:  Ibuprofen-diphenhydrAMINE Take  by mouth. amitriptyline 25 mg tablet Commonly known as:  ELAVIL Your last dose was: Last night at bedtime Your next dose is: Tonight at bedtime Take  by mouth nightly. finasteride 5 mg tablet Commonly known as:  PROSCAR Your last dose was: This morning Your next dose is:  Tomorrow Morning Take 1 Tab by mouth daily. 5 mg LUBRICANT EYE DROPS (GLYC-PG) OP Notes to Patient:  Resume home schedule Apply 1 Drop to eye two (2) times a day. 1 Drop PROAIR HFA 90 mcg/actuation inhaler Generic drug:  albuterol Take 1 Puff by inhalation every six (6) hours as needed for Wheezing or Shortness of Breath (\"seldom uses\" per pt). 1 Puff STOP taking these medications   
 oxybutynin 5 mg tablet Commonly known as:  EQIERGFP Where to Get Your Medications Information on where to get these meds will be given to you by the nurse or doctor. ! Ask your nurse or doctor about these medications HYDROcodone-acetaminophen  mg tablet Opioid Education Prescription Opioids: What You Need to Know: 
 
Prescription opioids can be used to help relieve moderate-to-severe pain and are often prescribed following a surgery or injury, or for certain health conditions. These medications can be an important part of treatment but also come with serious risks. Opioids are strong pain medicines. Examples include hydrocodone, oxycodone, fentanyl, and morphine. Heroin is an example of an illegal opioid. It is important to work with your health care provider to make sure you are getting the safest, most effective care. WHAT ARE THE RISKS AND SIDE EFFECTS OF OPIOID USE? Prescription opioids carry serious risks of addiction and overdose, especially with prolonged use. An opioid overdose, often marked by slow breathing, can cause sudden death. The use of prescription opioids can have a number of side effects as well, even when taken as directed. · Tolerance-meaning you might need to take more of a medication for the same pain relief · Physical dependence-meaning you have symptoms of withdrawal when the medication is stopped. Withdrawal symptoms can include nausea, sweating, chills, diarrhea, stomach cramps, and muscle aches. Withdrawal can last up to several weeks, depending on which drug you took and how long you took it. · Increased sensitivity to pain · Constipation · Nausea, vomiting, and dry mouth · Sleepiness and dizziness · Confusion · Depression · Low levels of testosterone that can result in lower sex drive, energy, and strength · Itching and sweating RISKS ARE GREATER WITH:      
· History of drug misuse, substance use disorder, or overdose · Mental health conditions (such as depression or anxiety) · Sleep apnea · Older age (72 years or older) · Pregnancy Avoid alcohol while taking prescription opioids. Also, unless specifically advised by your health care provider, medications to avoid include: · Benzodiazepines (such as Xanax or Valium) · Muscle relaxants (such as Soma or Flexeril) · Hypnotics (such as Ambien or Lunesta) · Other prescription opioids KNOW YOUR OPTIONS Talk to your health care provider about ways to manage your pain that don't involve prescription opioids. Some of these options may actually work better and have fewer risks and side effects. Options may include: 
· Pain relievers such as acetaminophen, ibuprofen, and naproxen · Some medications that are also used for depression or seizures · Physical therapy and exercise · Counseling to help patients learn how to cope better with triggers of pain and stress. · Application of heat or cold compress · Massage therapy · Relaxation techniques Be Informed Make sure you know the name of your medication, how much and how often to take it, and its potential risks & side effects.  
 
IF YOU ARE PRESCRIBED OPIOIDS FOR PAIN: 
 · Never take opioids in greater amounts or more often than prescribed. Remember the goal is not to be pain-free but to manage your pain at a tolerable level. · Follow up with your primary care provider to: · Work together to create a plan on how to manage your pain. · Talk about ways to help manage your pain that don't involve prescription opioids. · Talk about any and all concerns and side effects. · Help prevent misuse and abuse. · Never sell or share prescription opioids · Help prevent misuse and abuse. · Store prescription opioids in a secure place and out of reach of others (this may include visitors, children, friends, and family). · Safely dispose of unused/unwanted prescription opioids: Find your community drug take-back program or your pharmacy mail-back program, or flush them down the toilet, following guidance from the Food and Drug Administration (www.fda.gov/Drugs/ResourcesForYou). · Visit www.cdc.gov/drugoverdose to learn about the risks of opioid abuse and overdose. · If you believe you may be struggling with addiction, tell your health care provider and ask for guidance or call [address] [place], [place] [address]Lexplique - /l?k â€¢ splik/ at 5-065-025-QHLA. Discharge Instructions DISCHARGE SUMMARY from Nurse PATIENT INSTRUCTIONS: 
 
 
F-face looks uneven A-arms unable to move or move unevenly S-speech slurred or non-existent T-time-call 911 as soon as signs and symptoms begin-DO NOT go Back to bed or wait to see if you get better-TIME IS BRAIN. Warning Signs of HEART ATTACK Call 911 if you have these symptoms: 
? Chest discomfort. Most heart attacks involve discomfort in the center of the chest that lasts more than a few minutes, or that goes away and comes back. It can feel like uncomfortable pressure, squeezing, fullness, or pain. ? Discomfort in other areas of the upper body. Symptoms can include pain or discomfort in one or both arms, the back, neck, jaw, or stomach. ? Shortness of breath with or without chest discomfort. ? Other signs may include breaking out in a cold sweat, nausea, or lightheadedness. Don't wait more than five minutes to call [address]! Fast action can save your life. Calling 911 is almost always the fastest way to get lifesaving treatment. Emergency Medical Services staff can begin treatment when they arrive  up to an hour sooner than if someone gets to the hospital by car. The discharge information has been reviewed with the patient. The patient verbalized understanding. Discharge medications reviewed with the patient and appropriate educational materials and side effects teaching were provided. ___________________________________________________________________________________________________________________________________ Open Nephrectomy: What to Expect at [facility] Your Recovery A nephrectomy is surgery to take out part or all of the kidney. One or both kidneys may have been taken out. Sometimes other tissue near the kidney is taken out at the same time. The doctor took out your kidney through a long cut, called an incision, in the front or side of your belly. The incision will leave a scar that will fade with time. Your belly will feel sore after the surgery. This usually lasts about 1 to 2 weeks. Your doctor will give you pain medicine for this. You may also have other symptoms such as nausea, diarrhea, constipation, gas, or a headache. At first, you may have low energy and get tired quickly. It may take 3 to 6 months for your energy to fully return. Your body can work fine with one healthy kidney. If both kidneys are removed or your remaining kidney is not healthy, your doctor will talk to you about the kind of treatment you will need after surgery. This care sheet gives you a general idea about how long it will take for you to recover. But each person recovers at a different pace. Follow the steps below to get better as quickly as possible. How can you care for yourself at home? Activity ? · Rest when you feel tired. Getting enough sleep will help you recover. ? · Try to walk each day. Start by walking a little more than you did the day before. Bit by bit, increase the amount you walk. Walking boosts blood flow and helps prevent pneumonia and constipation. ? · Avoid exercises that use your belly muscles and strenuous activities, such as bicycle riding, jogging, weight lifting, or aerobic exercise, until your doctor says it is okay. ? · For at least 4 weeks, avoid lifting anything that would make you strain. This may include a child, heavy grocery bags and milk containers, a heavy briefcase or backpack, cat litter or dog food bags, or a vacuum . ? · Hold a pillow over the cut the doctor made (incision) when you cough or take deep breaths. This will support your belly and decrease your pain. ? · Do breathing exercises at home as instructed by your doctor. This will help prevent pneumonia. ? · Ask your doctor when you can drive again. ? · You will probably need to take 4 to 6 weeks off from work. It depends on the type of work you do and how you feel. ? · You may be able to take showers (unless you have a drainage tube near your incision). If you have a drainage tube, follow your doctor's instructions to empty and care for it. Do not take a bath for the first 2 weeks, or until your doctor tells you it is okay. ? · Ask your doctor when it is okay for you to have sex. Diet ? · You can eat your normal diet. If you were on a special diet for your kidneys before surgery, follow that diet until your doctor tells you to stop. ? · If your stomach is upset, try bland, low-fat foods like plain rice, broiled chicken, toast, and yogurt. ? · Drink plenty of fluids (unless your doctor tells you not to). ? · You may notice that your bowel movements are not regular right after your surgery. This is common. Try to avoid constipation and straining with bowel movements. You may want to take a fiber supplement every day. If you have not had a bowel movement after a couple of days, ask your doctor about taking a mild laxative. Medicines ? · Your doctor will tell you if and when you can restart your medicines. He or she will also give you instructions about taking any new medicines. ? · If you take blood thinners, such as warfarin (Coumadin), clopidogrel (Plavix), or aspirin, be sure to talk to your doctor. He or she will tell you if and when to start taking those medicines again. Make sure that you understand exactly what your doctor wants you to do. ? · Take pain medicines exactly as directed. ¨ If the doctor gave you a prescription medicine for pain, take it as prescribed. ¨ If you are not taking a prescription pain medicine, take an over-the-counter medicine that your doctor recommends. Read and follow all instructions on the label. ¨ Do not take aspirin, ibuprofen (Advil, Motrin), naproxen (Aleve), or other nonsteroidal anti-inflammatory drugs (NSAIDs) unless your doctor says it is okay. ? · If you think your pain medicine is making you sick to your stomach: 
¨ Take your medicine after meals (unless your doctor has told you not to). ¨ Ask your doctor for a different pain medicine. ? · If your doctor prescribed antibiotics, take them as directed. Do not stop taking them just because you feel better. You need to take the full course of antibiotics. Incision care ? · If you have strips of tape on the incision, leave the tape on for a week or until it falls off.  
? · Wash the area daily with warm, soapy water and pat it dry. Don't use hydrogen peroxide or alcohol, which can slow healing.  You may cover the area with a gauze bandage if it weeps or rubs against clothing. Change the bandage every day. ? · Keep the area clean and dry. Follow-up care is a key part of your treatment and safety. Be sure to make and go to all appointments, and call your doctor if you are having problems. It's also a good idea to know your test results and keep a list of the medicines you take. When should you call for help? Call 911 anytime you think you may need emergency care. For example, call if: 
? · You passed out (lost consciousness). ? · You have chest pain, are short of breath, or cough up blood. ?Call your doctor now or seek immediate medical care if: 
? · You have pain that does not get better after you take your pain medicine. ? · You have symptoms of a urinary tract infection. These may include: 
¨ Pain or burning when you urinate. ¨ A frequent need to urinate without being able to pass much urine. ¨ Pain in the flank, which is just below the rib cage and above the waist on either side of the back. ¨ Blood in the urine. ¨ A fever. ? · You have signs of infection, such as: 
¨ Increased pain, swelling, warmth, or redness. ¨ Red streaks leading from the incisions. ¨ Pus draining from the incisions. ¨ A fever. ? · You have loose stitches, or your incisions come open. ? · You are bleeding from the incisions. ? · You cannot urinate. ? · You are sick to your stomach or cannot drink fluids. ? · You have signs of a blood clot in your leg (called a deep vein thrombosis), such as: 
¨ Pain in the calf, back of the knee, thigh, or groin. ¨ Redness and swelling in your leg. ? Watch closely for changes in your health, and be sure to contact your doctor if you are having any problems. Where can you learn more? Go to http://matthew-maame.info/. Enter D636 in the search box to learn more about \"Open Nephrectomy: What to Expect at Home. \" Current as of: May 12, 2017 Content Version: 11.4 © 3973-4909 Healthwise, Incorporated. Care instructions adapted under license by [employer] (which disclaims liability or warranty for this information). If you have questions about a medical condition or this instruction, always ask your healthcare professional. [address] any warranty or liability for your use of this information. H2Mob Announcement We are excited to announce that we are making your provider's discharge notes available to you in H2Mob. You will see these notes when they are completed and signed by the physician that discharged you from your recent hospital stay. If you have any questions or concerns about any information you see in H2Mob, please call the Health Information Department where you were seen or reach out to your Primary Care Provider for more information about your plan of care. Introducing [facility]! [address] introduces H2Mob patient portal. Now you can access parts of your medical record, email your doctor's office, and request medication refills online. 1. In your internet browser, go to [email or web address] 2. Click on the First Time User? Click Here link in the Sign In box. You will see the New Member Sign Up page. 3. Enter your H2Mob Access Code exactly as it appears below. You will not need to use this code after youve completed the sign-up process. If you do not sign up before the expiration date, you must request a new code. · H2Mob Access Code: [email or web address] Expires: 7/25/2018  9:43 AM 
 
4. Enter the last four digits of your Social Security Number (xxxx) and Date of Birth (mm/dd/yyyy) as indicated and click Submit. You will be taken to the next sign-up page. 5. Create a H2Mob ID. This will be your H2Mob login ID and cannot be changed, so think of one that is secure and easy to remember. 6. Create a Tivix password. You can change your password at any time. 7. Enter your Password Reset Question and Answer. This can be used at a later time if you forget your password. 8. Enter your e-mail address. You will receive e-mail notification when new information is available in [address] 9. Click Sign Up. You can now view and download portions of your medical record. 10. Click the Download Summary menu link to download a portable copy of your medical information. If you have questions, please visit the Frequently Asked Questions section of the Tivix website. Remember, Tivix is NOT to be used for urgent needs. For medical emergencies, dial 911. Now available from your iPhone and Android! Introducing Jamal Early As a Axel Barkleys patient, I wanted to make you aware of our electronic visit tool called Jamal Early. [employer] 24/Cryptmint allows you to connect within minutes with a medical provider 24 hours a day, seven days a week via a mobile device or tablet or logging into a secure website from your computer. You can access Jamal Early from anywhere in the [place]. A virtual visit might be right for you when you have a simple condition and feel like you just dont want to get out of bed, or cant get away from work for an appointment, when your regular [facility] provider is not available (evenings, weekends or holidays), or when youre out of town and need minor care. Electronic visits cost only $49 and if the [facility] Curtume ErÃª/Cryptmint provider determines a prescription is needed to treat your condition, one can be electronically transmitted to a nearby pharmacy*. Please take a moment to enroll today if you have not already done so. The enrollment process is free and takes just a few minutes. To enroll, please download the [employer]/Cryptmint chris to your tablet or phone, or visit [email or web address] to enroll on your computer. And, as an [address] [place] [address] patient with a [employer] Secure account, the results of your visits will be scanned into your electronic medical record and your primary care provider will be able to view the scanned results. We urge you to continue to see your regular Odanah Lessen provider for your ongoing medical care. And while your primary care provider may not be the one available when you seek a [employer] Hatjodifin virtual visit, the peace of mind you get from getting a real diagnosis real time can be priceless. For more information on Consulted, view our Frequently Asked Questions (FAQs) at [email or web address]. Sincerely, 
 
Junaid Vieira MD 
Chief Medical Officer [address] *:  certain medications cannot be prescribed via Consulted Providers Seen During Your Hospitalization Provider Specialty Primary office phone Vic Harrison MD Urology [phone] Immunizations Administered for This Admission Name Date  
 TB Skin Test (PPD) Intradermal  Deferred (),  Deferred (), 5/9/2018 Your Primary Care Physician (PCP) Primary Care Physician Office Phone Office Fax Paulie Hadley [phone] You are allergic to the following No active allergies Recent Documentation Height Weight BMI Smoking Status 1.88 m 149.1 kg 42.22 kg/m2 Former Smoker Emergency Contacts Name Discharge Info Relation Home Work Mobile [facility] DISCHARGE CAREGIVER [3] Friend [5] [phone] Patient Belongings The following personal items are in your possession at time of discharge: 
  Dental Appliances: Lowers, Uppers  Visual Aid: Glasses      Home Medications: None   Jewelry: None  Clothing: Footwear, Undergarments, Pants, Shirt    Other Valuables: None Please provide this summary of care documentation to your next provider. Signatures-by signing, you are acknowledging that this After Visit Summary has been reviewed with you and you have received a copy. Patient Signature:  ____________________________________________________________ Date:  ____________________________________________________________  
  
Rexann Ports Provider Signature:  ____________________________________________________________ Date:  ____________________________________________________________

## 2018-05-07 NOTE — ANESTHESIA PREPROCEDURE EVALUATION
Anesthetic History               Review of Systems / Medical History  Patient summary reviewed and pertinent labs reviewed    Pulmonary    COPD: moderate               Neuro/Psych   Within defined limits           Cardiovascular    Hypertension: well controlled          CAD    Exercise tolerance: <4 METS: Limited by back pain and morbid obesity and not SOB  Comments: Patient states MI in '04 - no intervention. EKG showing 1st degree AV block.    GI/Hepatic/Renal  Within defined limits              Endo/Other        Morbid obesity and arthritis     Other Findings            Physical Exam    Airway  Mallampati: II  TM Distance: 4 - 6 cm  Neck ROM: normal range of motion   Mouth opening: Normal     Cardiovascular    Rhythm: regular  Rate: normal         Dental    Dentition: Edentulous     Pulmonary      Decreased breath sounds: bilateral           Abdominal         Other Findings            Anesthetic Plan    ASA: 3  Anesthesia type: general          Induction: Intravenous  Anesthetic plan and risks discussed with: Patient

## 2018-05-07 NOTE — OP NOTES
[facility] REPORT    Bozena Mathew  MR#: [id number]  : 1945  ACCOUNT #: [de-identified]   DATE OF SERVICE: 2018    SURGEON:  Romy Hays MD     ASSISTANT: Kassy Vidal     PREOPERATIVE DIAGNOSIS:  A 4.3 cm left enhancing renal mass. POSTOPERATIVE DIAGNOSIS:  A 4.3 cm left enhancing renal mass. PROCEDURE PERFORMED:  Left laparoscopic nephrectomy. ANESTHESIA:  General.    ESTIMATED BLOOD LOSS:  40 mL    SPECIMENS REMOVED: l kidney     COMPLICATIONS:  None apparent. IMPLANTS: none     INDICATIONS:  This is a morbidly obese, large 76-year-old gentleman with several-year history of a left renal mass that had been followed with surveillance and had continued to enlarge and after discussing the risks versus benefits after his most recent CAT scan from 2018 he decided to move forward with the above-named procedure. FINDINGS:  The case was more difficult than usual due to the patient's large size and his morbid obesity and took longer than a typical laparoscopic nephrectomy with more risk, but was finished without any known complication. TECHNIQUE:  The patient was taken to the operating room and placed in supine position. Anesthesia was induced via anesthesiology service. Orogastric tube and Aguilar catheter was placed. He was placed on a 45-degree bump, left side up, all pressure points adequately padded and then prepped and draped in sterile surgical fashion with abdomen in the sterile field. A small incision was made just above the umbilicus and a Veress needle was used to insufflate the peritoneal cavity. A 12 mm port was placed here under direct vision and pan laparoscopy revealed no pathology. I placed two more 12 mm ports for a total of three 12 mm ports and began by dividing the white line of Toldt and reflecting the colon and colonic fat medially.   There was several more centimeters of fat here than typical and this portion of the procedure went extremely slowly. I was able to identify the ureter and the gonadal vein distally and then began to follow them in a caudal to cranial fashion, identifying the retroperitoneum. Eventually, the gonadal vein dipped down towards the junction of the left renal vein and the vena cava and the gonadal vein was stapled using Endo-ABNER stapler. I continued more cranially until the hilar vessels were identified. The artery and vein were stapled in one staple load of a 6 cm long Endo-ABNER stapler from [employer], which was a beige load. I then continued more superiorly, and the most taxing portion of the procedure was actually division of the fat and connections of the kidney to the most superior portion of the peritoneum and retroperitoneum. The adrenal gland was left in place during this division. The lateral attachments were taken and eventually the ureter itself was divided using clips until the kidney was free of all attachments. We then placed it in an ectopic location, dropped the pressure to 5, inspected the entire field for hemostasis, which had been obtained, placed the kidney back in its orthotopic location, entered the peritoneal cavity in an EndoCatch 2 bag through the supraumbilical port site and placed the kidney within the bag and brought the edges of the bag out through the skin incision. A Antwan-Chito suture passer device was used to place a Vicryl stitch around the fascia of the other two 12 mm ports, closing the fascia and we then made the incision at the extraction site around the left side of the umbilicus and then inferiorly, just large enough to deliver the kidney within the bag outside of the body. The fascia of this extraction site was closed using a #1 PDS on a CT1 needle in an interrupted fashion, followed by closure of the skin at all sites using stapler. This concluded today's procedure. The patient was taken to PACU in stable condition.       Renetta Neves MD SHIRIN / ROBERT  D: 05/07/2018 13:15     T: 05/07/2018 17:52  JOB #: [id number]

## 2018-05-07 NOTE — PERIOP NOTES
TRANSFER - OUT REPORT:    Verbal report given to Shashank Romero RN(name) on Vern Bailon  being transferred to [address] [place] [address] for routine post - op       Report consisted of patients Situation, Background, Assessment and   Recommendations(SBAR). Information from the following report(s) SBAR, Kardex, OR Summary, Procedure Summary, Intake/Output and MAR was reviewed with the receiving nurse. Lines:   Peripheral IV 05/07/18 Left Hand (Active)   Site Assessment Clean, dry, & intact 5/7/2018  2:12 PM   Phlebitis Assessment 0 5/7/2018  2:12 PM   Infiltration Assessment 0 5/7/2018  2:12 PM   Dressing Status Clean, dry, & intact 5/7/2018  2:12 PM   Dressing Type Transparent 5/7/2018  2:12 PM   Hub Color/Line Status Green; Infusing 5/7/2018  2:12 PM   Alcohol Cap Used No 5/7/2018  2:12 PM       Peripheral IV 05/07/18 Right Wrist (Active)   Site Assessment Clean, dry, & intact 5/7/2018  2:12 PM   Phlebitis Assessment 0 5/7/2018  2:12 PM   Infiltration Assessment 0 5/7/2018  2:12 PM   Dressing Status Clean, dry, & intact 5/7/2018  2:12 PM   Dressing Type Transparent 5/7/2018  2:12 PM   Hub Color/Line Status Infusing;Gray 5/7/2018  2:12 PM   Alcohol Cap Used No 5/7/2018  2:12 PM        Opportunity for questions and clarification was provided. Patient transported with:   O2 @ 3 liters    VTE prophylaxis orders have been written for Vern Bailon. Patient and family given floor number and nurses name. Family updated re: pt status after security code verified.

## 2018-05-07 NOTE — PROGRESS NOTES
Hourly rounds performed, all needs met. Pain medication given per MAR. Will monitor pt until nightshift, RN arrives.

## 2018-05-07 NOTE — PROGRESS NOTES
TRANSFER - IN REPORT:    Verbal report received from [facility], RN(name) on Duard Leak  being received from [employer]) for routine post - op      Report consisted of patients Situation, Background, Assessment and   Recommendations(SBAR). Information from the following report(s) SBAR was reviewed with the receiving nurse. Opportunity for questions and clarification was provided. Assessment completed upon patients arrival to unit and care assumed.

## 2018-05-07 NOTE — PROGRESS NOTES
Dual skin assessment formed with Cinda Melendrez RN. Impairment noted:  Pt had L nephrectomy lap X4 sites to L abd (drsg c,d,i), bilateral knee scars, minimal redness to buttocks, scab noted to L arm.

## 2018-05-07 NOTE — ANESTHESIA POSTPROCEDURE EVALUATION
Post-Anesthesia Evaluation and Assessment    Patient: Amy Reis MRN: [id number]  SSN: [id number]    YOB: 1945  Age: 75 y.o. Sex: male       Cardiovascular Function/Vital Signs  Visit Vitals    /79    Pulse 74    Temp 36.5 °C (97.7 °F)    Resp 18    Ht 6' 2\" (1.88 m)    Wt 146.5 kg (323 lb)    SpO2 95%    BMI 41.47 kg/m2       Patient is status post general anesthesia for Procedure(s):  LEFT NEPHRECTOMY LAPAROSCOPIC. Nausea/Vomiting: None    Postoperative hydration reviewed and adequate. Pain:  Pain Scale 1: Visual (05/07/18 1404)  Pain Intensity 1: 10 (05/07/18 1404)   Managed    Neurological Status:   Neuro (WDL): Exceptions to WDL (05/07/18 1311)  Neuro  Neurologic State: Drowsy (05/07/18 1311)  Orientation Level: Oriented to person (05/07/18 1311)  Cognition: Decreased attention/concentration;Poor safety awareness (05/07/18 1311)  Speech:  (mumbles) (05/07/18 1311)  LUE Motor Response: Purposeful (05/07/18 1311)  LLE Motor Response: Purposeful (05/07/18 1311)  RUE Motor Response: Purposeful (05/07/18 1311)  RLE Motor Response: Purposeful (05/07/18 1311)   At baseline    Mental Status and Level of Consciousness: Arousable    Pulmonary Status:   O2 Device: Nasal cannula (Simultaneous filing. User may not have seen previous data.) (05/07/18 1343)   Adequate oxygenation and airway patent    Complications related to anesthesia: None    Post-anesthesia assessment completed.  No concerns    Signed By: Kalin Hooper MD     May 7, 2018

## 2018-05-08 LAB
ANION GAP SERPL CALC-SCNC: 9 MMOL/L (ref 7–16)
BUN SERPL-MCNC: 12 MG/DL (ref 8–23)
CALCIUM SERPL-MCNC: 8.5 MG/DL (ref 8.3–10.4)
CHLORIDE SERPL-SCNC: 103 MMOL/L (ref 98–107)
CO2 SERPL-SCNC: 27 MMOL/L (ref 21–32)
CREAT SERPL-MCNC: 1.33 MG/DL (ref 0.8–1.5)
ERYTHROCYTE [DISTWIDTH] IN BLOOD BY AUTOMATED COUNT: 13.5 % (ref 11.9–14.6)
GLUCOSE SERPL-MCNC: 123 MG/DL (ref 65–100)
HCT VFR BLD AUTO: 41.8 % (ref 41.1–50.3)
HGB BLD-MCNC: 14.3 G/DL (ref 13.6–17.2)
MCH RBC QN AUTO: 30.4 PG (ref 26.1–32.9)
MCHC RBC AUTO-ENTMCNC: 34.2 G/DL (ref 31.4–35)
MCV RBC AUTO: 88.7 FL (ref 79.6–97.8)
PLATELET # BLD AUTO: 204 K/UL (ref 150–450)
PMV BLD AUTO: 9.3 FL (ref 10.8–14.1)
POTASSIUM SERPL-SCNC: 4.5 MMOL/L (ref 3.5–5.1)
RBC # BLD AUTO: 4.71 M/UL (ref 4.23–5.67)
SODIUM SERPL-SCNC: 139 MMOL/L (ref 136–145)
WBC # BLD AUTO: 12.8 K/UL (ref 4.3–11.1)

## 2018-05-08 PROCEDURE — 74011000258 HC RX REV CODE- 258: Performed by: UROLOGY

## 2018-05-08 PROCEDURE — 74011250636 HC RX REV CODE- 250/636: Performed by: UROLOGY

## 2018-05-08 PROCEDURE — 80048 BASIC METABOLIC PNL TOTAL CA: CPT | Performed by: UROLOGY

## 2018-05-08 PROCEDURE — 97530 THERAPEUTIC ACTIVITIES: CPT

## 2018-05-08 PROCEDURE — 85027 COMPLETE CBC AUTOMATED: CPT | Performed by: UROLOGY

## 2018-05-08 PROCEDURE — 97161 PT EVAL LOW COMPLEX 20 MIN: CPT

## 2018-05-08 PROCEDURE — 74011250637 HC RX REV CODE- 250/637: Performed by: UROLOGY

## 2018-05-08 PROCEDURE — 97166 OT EVAL MOD COMPLEX 45 MIN: CPT

## 2018-05-08 PROCEDURE — 65270000029 HC RM PRIVATE

## 2018-05-08 PROCEDURE — 77030027138 HC INCENT SPIROMETER -A

## 2018-05-08 PROCEDURE — 74011250637 HC RX REV CODE- 250/637: Performed by: NURSE PRACTITIONER

## 2018-05-08 PROCEDURE — 36415 COLL VENOUS BLD VENIPUNCTURE: CPT | Performed by: UROLOGY

## 2018-05-08 RX ORDER — FACIAL-BODY WIPES
10 EACH TOPICAL ONCE
Status: ACTIVE | OUTPATIENT
Start: 2018-05-08 | End: 2018-05-08

## 2018-05-08 RX ADMIN — HYDROCODONE BITARTRATE AND ACETAMINOPHEN 1 TABLET: 10; 325 TABLET ORAL at 19:23

## 2018-05-08 RX ADMIN — DEXTROSE MONOHYDRATE AND SODIUM CHLORIDE 100 ML/HR: 5; .45 INJECTION, SOLUTION INTRAVENOUS at 21:10

## 2018-05-08 RX ADMIN — DEXTROSE MONOHYDRATE AND SODIUM CHLORIDE 100 ML/HR: 5; .45 INJECTION, SOLUTION INTRAVENOUS at 13:00

## 2018-05-08 RX ADMIN — Medication 10 ML: at 14:29

## 2018-05-08 RX ADMIN — AMITRIPTYLINE HYDROCHLORIDE 25 MG: 50 TABLET, FILM COATED ORAL at 21:07

## 2018-05-08 RX ADMIN — HYDROMORPHONE HYDROCHLORIDE 1 MG: 2 INJECTION, SOLUTION INTRAMUSCULAR; INTRAVENOUS; SUBCUTANEOUS at 16:02

## 2018-05-08 RX ADMIN — DOCUSATE SODIUM 100 MG: 100 CAPSULE, LIQUID FILLED ORAL at 08:45

## 2018-05-08 RX ADMIN — HYDROCODONE BITARTRATE AND ACETAMINOPHEN 1 TABLET: 10; 325 TABLET ORAL at 08:45

## 2018-05-08 RX ADMIN — HYDROMORPHONE HYDROCHLORIDE 1 MG: 2 INJECTION, SOLUTION INTRAMUSCULAR; INTRAVENOUS; SUBCUTANEOUS at 05:51

## 2018-05-08 RX ADMIN — Medication 10 ML: at 05:18

## 2018-05-08 RX ADMIN — FINASTERIDE 5 MG: 5 TABLET, FILM COATED ORAL at 08:45

## 2018-05-08 RX ADMIN — DEXTROSE MONOHYDRATE AND SODIUM CHLORIDE 100 ML/HR: 5; .45 INJECTION, SOLUTION INTRAVENOUS at 00:30

## 2018-05-08 RX ADMIN — Medication 10 ML: at 21:08

## 2018-05-08 RX ADMIN — DOCUSATE SODIUM 100 MG: 100 CAPSULE, LIQUID FILLED ORAL at 17:22

## 2018-05-08 RX ADMIN — HYDROCODONE BITARTRATE AND ACETAMINOPHEN 1 TABLET: 10; 325 TABLET ORAL at 00:26

## 2018-05-08 NOTE — PROGRESS NOTES
Problem: Self Care Deficits Care Plan (Adult)  Goal: *Acute Goals and Plan of Care (Insert Text)  1. Patient will complete lower body bathing and dressing with minimal assistance and adaptive equipment as needed. 2. Patient will complete toileting with minimal assistance. 3. Patient will tolerate 25 minutes of OT treatment with 2-3 rest breaks to increase activity tolerance for ADLs. 4. Patient will complete functional transfers with supervision and adaptive equipment as needed. 5. Patient will demonstrate ability to complete log roll technique with supine to sit with supervision to decrease pain in abdomen with bed mobility. Timeframe: 7 visits       OCCUPATIONAL THERAPY: Initial Assessment, Treatment Day: 1st and AM 5/8/2018  INPATIENT: Hospital Day: 2  [place] / [place] / Product Type: Medicare /      NAME/AGE/GENDER: Gary Milan is a 75 y.o. male   PRIMARY DIAGNOSIS:  Left renal mass [N28.89] Left renal mass Left renal mass  Procedure(s) (LRB):  LEFT NEPHRECTOMY LAPAROSCOPIC (Left)  1 Day Post-Op  ICD-10: Treatment Diagnosis:    · Generalized Muscle Weakness (M62.81)  · Other lack of cordination (R27.8)   Precautions/Allergies:     Review of patient's allergies indicates no known allergies. ASSESSMENT:     Mr. Dread Velasquez presents to the hospital with L renal mass and is s/p L nephrectomy. Pt was supine in bed upon arrival with friend at bedside. Pt is hard of hearing but does have hearing aids in both ears. Pt reports he lives alone and is typically independent. Pt doesn't have any equipment at home. Pt verbalizes 8/10 pain in the abdomen (pt just received pain medication). Pt is anxious and needs education on therapy and why it is important to participate in mobility after surgery. Pt needed some encouragement to participate. Pt initially wanted to pull up into sitting from supine, but pt educated on log roll technique to decrease pain levels.  Pt with significant pain with bed mobility and somewhat impulsive edge of bed. Pt scooting very close to the edge of the bed. Pt reports 10/10 pain and states he feels like he has to urinate and that this is painful too. Pt educated about the méndez catheter. Pt demonstrates decreased safety awareness pulling up onto the walker into standing with minimal to moderate assistance needed. Pt completed transfer to the chair with minimal assistance. Pt unable to tolerate further activity due to pain levels. Pt limited in B shoulders (R more limited than L) and states he has hx of arthritis and RC tears. Pt left sitting up in chair with all needs at bedside. Pt wants to go home at discharge, stating he has a neighbor he can call if he needs anything. Pt limited by pain and needs encouragement to move. Pt is currently functioning below baseline and will benefit from OT services to address stated goals and plan of care. This section established at most recent assessment   PROBLEM LIST (Impairments causing functional limitations):  1. Decreased Strength  2. Decreased ADL/Functional Activities  3. Decreased Transfer Abilities  4. Decreased Ambulation Ability/Technique  5. Decreased Balance  6. Increased Pain  7. Decreased Activity Tolerance  8. Decreased Flexibility/Joint Mobility  9. Edema/Girth  10. Decreased Knowledge of Precautions  11. Decreased [place] with Home Exercise Program  12. Decreased Cognition   INTERVENTIONS PLANNED: (Benefits and precautions of occupational therapy have been discussed with the patient.)  1. Activities of daily living training  2. Adaptive equipment training  3. Balance training  4. Clothing management  5. Cognitive training  6. Donning&doffing training  7. Neuromuscular re-eduation  8. Therapeutic activity  9. Therapeutic exercise     TREATMENT PLAN: Frequency/Duration: Follow patient 3 times per week to address above goals.   Rehabilitation Potential For Stated Goals: Good     RECOMMENDED REHABILITATION/EQUIPMENT: (at time of discharge pending progress): Due to the probability of continued deficits (see above) this patient will likely need continued skilled occupational therapy after discharge. Equipment:    TBD              OCCUPATIONAL PROFILE AND HISTORY:   History of Present Injury/Illness (Reason for Referral):  See H&P  Past Medical History/Comorbidities:   Mr. Yvrose Chavira  has a past medical history of Adverse effect of anesthesia; Anxiety; Arthritis; Borderline diabetes; CAD (coronary artery disease) (2004); Chronic pain; COPD; Depression; Diverticulitis; Former smoker, stopped smoking in distant past; Heart murmur; History of shingles; [place] (hard of hearing); Hyperlipidemia; Hypertension; Hypertrophy of prostate with urinary obstruction and other lower urinary tract symptoms (LUTS); Impotence of organic origin; Insomnia; Left renal mass; Morbid obesity with BMI of 40.0-44.9, adult ([facility].) (04/30/2018); PONV (postoperative nausea and vomiting); Stenosis of lumbosacral spine; and Unspecified adverse effect of anesthesia. Mr. Yvrose Chavira  has a past surgical history that includes hx colonoscopy; hx femur fracture tx (Left, 1974, 1975); hx urological; hx urological; pr total knee arthroplasty (Bilateral); hx lumbar laminectomy; and hx cataract removal (Bilateral). Social History/Living Environment:   Home Environment: Private residence  # Steps to Enter: 3  One/Two Story Residence: One story  Living Alone: Yes  Support Systems: Nondenominational / bro community  Patient Expects to be Discharged to[de-identified] Private residence  Current DME Used/Available at Home: None  Prior Level of Function/Work/Activity:  Pt lives alone. Pt reports that prior to admission he was independent with AdL/functional mobility. Pt reports no hx of falls. Pt still drives. Personal Factors:          Social Background:  Lives alone.          Overall Behavior:  Anxious; impulsive due to pain        Other factors that influence how disability is experienced by the patient:  Multiple co-morbidities    Number of Personal Factors/Comorbidities that affect the Plan of Care: Extensive review of physical, cognitive, and psychosocial performance (3+):  HIGH COMPLEXITY   ASSESSMENT OF OCCUPATIONAL PERFORMANCE[de-identified]   Activities of Daily Living:           Basic ADLs (From Assessment) Complex ADLs (From Assessment)   Feeding: Setup  Oral Facial Hygiene/Grooming: Stand-by assistance  Bathing: Moderate assistance  Upper Body Dressing: Moderate assistance  Lower Body Dressing: Total assistance  Toileting: Total assistance Instrumental ADL  Meal Preparation: Total assistance  Homemaking: Total assistance   Grooming/Bathing/Dressing Activities of Daily Living     Cognitive Retraining  Safety/Judgement: Awareness of environment;Decreased awareness of need for safety; Fall prevention;Home safety                       Bed/Mat Mobility  Rolling: Moderate assistance  Supine to Sit: Moderate assistance  Sit to Stand: Moderate assistance  Bed to Chair: Minimum assistance       Most Recent Physical Functioning:   Gross Assessment:  AROM: Generally decreased, functional (hx of RC tears; arthritis; B shoulders limited)  Strength: Generally decreased, functional  Coordination: Generally decreased, functional               Posture:     Balance:  Sitting: Impaired  Sitting - Static: Prop sitting  Sitting - Dynamic: Prop sitting  Standing: Impaired  Standing - Static: Constant support  Standing - Dynamic : Fair Bed Mobility:  Rolling: Moderate assistance  Supine to Sit: Moderate assistance  Wheelchair Mobility:     Transfers:  Sit to Stand:  Moderate assistance  Stand to Sit: Minimum assistance  Bed to Chair: Minimum assistance                Patient Vitals for the past 6 hrs:   BP BP Patient Position SpO2 Pulse   05/08/18 0544 163/79 - 96 % 61   05/08/18 0800 132/71 Head of bed elevated (Comment degrees) 93 % 64       Mental Status  Neurologic State: Alert  Orientation Level: Oriented to person, Oriented to place  Cognition: Decreased attention/concentration, Impulsive  Perception: Appears intact  Perseveration: No perseveration noted  Safety/Judgement: Awareness of environment, Decreased awareness of need for safety, Fall prevention, Home safety                          Physical Skills Involved:  1. Range of Motion  2. Balance  3. Strength  4. Activity Tolerance  5. Pain (acute)  6. Edema Cognitive Skills Affected (resulting in the inability to perform in a timely and safe manner):  1. Sustained Attention  2. Divided Attention Psychosocial Skills Affected:  1. Habits/Routines  2. Environmental Adaptation  3. Self-Awareness   Number of elements that affect the Plan of Care: 5+:  HIGH COMPLEXITY   CLINICAL DECISION MAKIN[address] [place] [address] AM-PAC 6 Clicks   Daily Activity Inpatient Short Form  How much help from another person does the patient currently need. .. Total A Lot A Little None   1. Putting on and taking off regular lower body clothing? [x] 1   [] 2   [] 3   [] 4   2. Bathing (including washing, rinsing, drying)? [] 1   [x] 2   [] 3   [] 4   3. Toileting, which includes using toilet, bedpan or urinal?   [x] 1   [] 2   [] 3   [] 4   4. Putting on and taking off regular upper body clothing? [] 1   [x] 2   [] 3   [] 4   5. Taking care of personal grooming such as brushing teeth? [] 1   [] 2   [x] 3   [] 4   6. Eating meals? [] 1   [] 2   [x] 3   [] 4   © , Trustees of [address] [place] [address], under license to [employer]. All rights reserved      Score:  Initial: 12 Most Recent: X (Date: -- )    Interpretation of Tool:  Represents activities that are increasingly more difficult (i.e. Bed mobility, Transfers, Gait). Score 24 23 22-20 19-15 14-10 9-7 6     Modifier CH CI CJ CK CL CM CN      ?  Self Care:     - CURRENT STATUS: CL - 60%-79% impaired, limited or restricted    - GOAL STATUS: CK - 40%-59% impaired, limited or restricted    - D/C STATUS:  ---------------To be determined---------------  [place] / [place] / Product Type: Medicare /      Medical Necessity:     · Patient demonstrates good rehab potential due to higher previous functional level. Reason for Services/Other Comments:  · Patient continues to require skilled intervention due to decreased independence with ADL/functional transfers. Use of outcome tool(s) and clinical judgement create a POC that gives a: MODERATE COMPLEXITY         TREATMENT:   (In addition to Assessment/Re-Assessment sessions the following treatments were rendered)     Pre-treatment Symptoms/Complaints:    Pain: Initial:   Pain Intensity 1: 8  Pain Location 1: Abdomen, Back, Shoulder  Pain Orientation 1: Left  Pain Intervention(s) 1: Repositioned  Post Session:  10/10 abdomen       Therapeutic Activity: (    15): Therapeutic activities including Bed transfers, Chair transfers and Ambulation on level ground to improve mobility, strength, balance and coordination. Required minimal assistance   to promote static and dynamic balance in standing.     n/a    Braces/Orthotics/Lines/Etc:   · IV  · méndez catheter  · O2 Device: Nasal cannula  Treatment/Session Assessment:    · Response to Treatment:  Pt tolerated with increased pain levels. · Interdisciplinary Collaboration:   o Occupational Therapist  o Registered Nurse  · After treatment position/precautions:   o Up in chair  o Bed/Chair-wheels locked  o Call light within reach  o RN notified  o Visitors at bedside   · Compliance with Program/Exercises: Will assess as treatment progresses. · Recommendations/Intent for next treatment session: \"Next visit will focus on advancements to more challenging activities and reduction in assistance provided\".   Total Treatment Duration:  OT Patient Time In/Time Out  Time In: 0920  Time Out: [facility]

## 2018-05-08 NOTE — PROGRESS NOTES
Pt instructed to get out of bed today and we will medicate him for pain prior to getting out of bed. Important to move and get up to prevent pneumonia and to prevent complications. Incenitive spirometer given to pt. Pt instructed and demonstrated use of incentive spirometer. Voiced understanding of teaching.

## 2018-05-08 NOTE — PROGRESS NOTES
Problem: Mobility Impaired (Adult and Pediatric)  Goal: *Acute Goals and Plan of Care (Insert Text)  LTG:  (1.)Mr. Kia Carcamo will move from supine to sit and sit to supine, scoot up and down and roll side to side INDEPENDENTLY with bed flat within 7 treatment day(s). (2.)Mr. Kia Carcamo will transfer from bed to chair and chair to bed with MODIFIED INDEPENDENCE using the least restrictive device within 7 treatment day(s). (3.)Mr. Kia Carcamo will ambulate with SUPERVISION for 250+ feet with the least restrictive device within 7 treatment day(s). (4.)Mr. Kia Carcamo will ascend and descend 3 steps with STAND BY ASSIST using handrail within 7 days. ________________________________________________________________________________________________    PHYSICAL THERAPY: Initial Assessment, AM 5/8/2018  INPATIENT: Hospital Day: 2  [place] / Product Type: Medicare /      NAME/AGE/GENDER: Ida Whiting is a 75 y.o. male   PRIMARY DIAGNOSIS: Left renal mass [N28.89] Left renal mass Left renal mass  Procedure(s) (LRB):  LEFT NEPHRECTOMY LAPAROSCOPIC (Left)  1 Day Post-Op  ICD-10: Treatment Diagnosis:    · Difficulty in walking, Not elsewhere classified (R26.2)  · Other abnormalities of gait and mobility (R26.89)   Precaution/Allergies:  Review of patient's allergies indicates no known allergies. ASSESSMENT:     Mr. Kia Carcamo is a 75year old male seen for initial therapy assessment following left nephrectomy. He presents sitting up in chair and is agreeable to therapy evaluation/mobility. Pt lives alone and is independent at baseline with mobility, ambulation without any DME. Denies falls. We reviewed sit-stand transfer mechanics and safety. Needs min-mod assist to stand from low chair and has slightly impaired safety awareness. Ambulates total of 65' in room and hallway with walker and close CGA for safety. Verbal cues for upright posture as he is limited by post op abdominal pain and soreness.  Pt has decreased gait speed and heavy reliance on walker today. Returned to room and back up to chair after encouragement. Left with  present in room. Berto Wise is demonstrating expected post op deficits with strength, mobility, and activity tolerance and is limited by pain. He will benefit from continued therapy during acute care stay to address deficits and maximize safety/independence with mobility. May benefit from rehab at discharge due to current deficits. This section established at most recent assessment   PROBLEM LIST (Impairments causing functional limitations):  1. Decreased Strength  2. Decreased ADL/Functional Activities  3. Decreased Transfer Abilities  4. Decreased Ambulation Ability/Technique  5. Decreased Balance  6. Increased Pain  7. Decreased Activity Tolerance   INTERVENTIONS PLANNED: (Benefits and precautions of physical therapy have been discussed with the patient.)  1. Balance Exercise  2. Bed Mobility  3. Gait Training  4. Therapeutic Activites  5. Therapeutic Exercise/Strengthening  6. Transfer Training  7. Group Therapy     TREATMENT PLAN: Frequency/Duration: 3 times a week for duration of hospital stay  Rehabilitation Potential For Stated Goals: Good     RECOMMENDED REHABILITATION/EQUIPMENT: (at time of discharge pending progress): Due to the probability of continued deficits (see above) this patient will likely need continued skilled physical therapy after discharge. Equipment:    Walkers, Type: Rolling Walker              HISTORY:   History of Present Injury/Illness (Reason for Referral):  Pt s/p left nephrectomy  Past Medical History/Comorbidities:   Mr. Myrna Hernandez  has a past medical history of Adverse effect of anesthesia; Anxiety; Arthritis; Borderline diabetes; CAD (coronary artery disease) (2004); Chronic pain; COPD; Depression; Diverticulitis; Former smoker, stopped smoking in distant past; Heart murmur; History of shingles; [place] (hard of hearing);  Hyperlipidemia; Hypertension; Hypertrophy of prostate with urinary obstruction and other lower urinary tract symptoms (LUTS); Impotence of organic origin; Insomnia; Left renal mass; Morbid obesity with BMI of 40.0-44.9, adult ([facility] (04/30/2018); PONV (postoperative nausea and vomiting); Stenosis of lumbosacral spine; and Unspecified adverse effect of anesthesia. Mr. Kelsi Kenny  has a past surgical history that includes hx colonoscopy; hx femur fracture tx (Left, 1974, 1975); hx urological; hx urological; pr total knee arthroplasty (Bilateral); hx lumbar laminectomy; and hx cataract removal (Bilateral). Social History/Living Environment:   Home Environment: Private residence  # Steps to Enter: 3  Wheelchair Ramp: No  One/Two Story Residence: One story  Living Alone: Yes  Support Systems: [employer] / bro community, Family member(s)  Patient Expects to be Discharged to[de-identified] Unknown  Current DME Used/Available at Home: Cane, straight  Prior Level of Function/Work/Activity:  Lives alone in single story home with 3 steps. Independent at baseline without DME. Drives. No falls. Has family and [employer] community near by to help. Number of Personal Factors/Comorbidities that affect the Plan of Care: 1-2: MODERATE COMPLEXITY   EXAMINATION:   Most Recent Physical Functioning:   Gross Assessment:  AROM: Within functional limits  Strength: Generally decreased, functional  Coordination: Within functional limits               Posture:  Posture (WDL): Exceptions to WDL  Posture Assessment: Forward head, Rounded shoulders, Trunk flexion  Balance:  Sitting: With support  Standing: Impaired  Standing - Static: Fair  Standing - Dynamic : Fair Bed Mobility:     Wheelchair Mobility:     Transfers:  Sit to Stand: Minimum assistance; Moderate assistance  Stand to Sit: Contact guard assistance  Bed to Chair: Minimum assistance;Contact guard assistance  Gait:     Base of Support: Center of gravity altered  Speed/Dinorah: Pace decreased (<100 feet/min)  Step Length: Left shortened;Right shortened  Gait Abnormalities: Trunk sway increased;Decreased step clearance  Distance (ft): 65 Feet (ft)  Assistive Device: Walker, rolling  Ambulation - Level of Assistance: Contact guard assistance  Interventions: Verbal cues; Safety awareness training      Body Structures Involved:  1. Muscles Body Functions Affected:  1. Sensory/Pain  2. Movement Related Activities and Participation Affected:  1. General Tasks and Demands  2. Mobility  3. Self Care  4. Domestic Life  5. Community, Social and [place]   Number of elements that affect the Plan of Care: 4+: HIGH COMPLEXITY   CLINICAL PRESENTATION:   Presentation: Stable and uncomplicated: LOW COMPLEXITY   CLINICAL DECISION MAKIN [facility] Mobility Inpatient Short Form  How much difficulty does the patient currently have. .. Unable A Lot A Little None   1. Turning over in bed (including adjusting bedclothes, sheets and blankets)? [] 1   [] 2   [x] 3   [] 4   2. Sitting down on and standing up from a chair with arms ( e.g., wheelchair, bedside commode, etc.)   [] 1   [x] 2   [] 3   [] 4   3. Moving from lying on back to sitting on the side of the bed? [] 1   [x] 2   [] 3   [] 4   How much help from another person does the patient currently need. .. Total A Lot A Little None   4. Moving to and from a bed to a chair (including a wheelchair)? [] 1   [] 2   [x] 3   [] 4   5. Need to walk in hospital room? [] 1   [] 2   [x] 3   [] 4   6. Climbing 3-5 steps with a railing? [] 1   [x] 2   [] 3   [] 4   © , Trustees of [address] [place] [address] [address] [id number], under license to [employer]. All rights reserved      Score:  Initial: 15 Most Recent: X (Date: -- )    Interpretation of Tool:  Represents activities that are increasingly more difficult (i.e. Bed mobility, Transfers, Gait). Score 24 23 22-20 19-15 14-10 9-7 6     Modifier CH CI CJ CK CL CM CN      ?  Mobility - Walking and Moving Around:     - CURRENT STATUS: CK - 40%-59% impaired, limited or restricted    - GOAL STATUS: CI - 1%-19% impaired, limited or restricted    - D/C STATUS:  ---------------To be determined---------------  [place] / [place] / Product Type: Medicare /      Medical Necessity:     · Patient demonstrates good rehab potential due to higher previous functional level. Reason for Services/Other Comments:  · Patient continues to demonstrate capacity to improve strength, mobility, balance, transfers, activity tolerance which will increase independence and increase safety. Use of outcome tool(s) and clinical judgement create a POC that gives a: Clear prediction of patient's progress: LOW COMPLEXITY            TREATMENT:   (In addition to Assessment/Re-Assessment sessions the following treatments were rendered)   Pre-treatment Symptoms/Complaints:  \"I have a bad back too, so that doesn't help\"  Pain: Initial:   Pain Intensity 1: 2  Pain Location 1: Abdomen  Pain Intervention(s) 1: Repositioned, Ambulation/Increased Activity  Post Session:  0/10     Assessment/Reassessment only, no treatment provided today    Braces/Orthotics/Lines/Etc:   · IV  · méndez catheter  · O2 Device: Room air  Treatment/Session Assessment:    · Response to Treatment:  Pt performs mobility with CGA-min assist  · Interdisciplinary Collaboration:   o Physical Therapist  o Registered Nurse  · After treatment position/precautions:   o Up in chair  o Bed/Chair-wheels locked  o Bed in low position  o Call light within reach  o RN notified  o Visitors at bedside   · Compliance with Program/Exercises: Will assess as treatment progresses. · Recommendations/Intent for next treatment session: \"Next visit will focus on advancements to more challenging activities and reduction in assistance provided\".   Total Treatment Duration:  PT Patient Time In/Time Out  Time In: 1122  Time Out: ALEYDA Kirby

## 2018-05-08 NOTE — PROGRESS NOTES
Required pain med Dilaudid 1mg x2 this shif tfor pain and hydrocodone x1 for pain . Pt stated good pain releif Drsg to abdomen intact. Aguilar catheter drained clear yellow urine and had good output. Hourly rounds completed throughout this shift. Pt denies needs at this time. Bed in locked and low position, bedside table within reach. Will continue to monitor and give bedside shift report to oncoming day shift nurse.

## 2018-05-08 NOTE — PROGRESS NOTES
Admit Date: 5/7/2018    Subjective:     Mr. Radha King is resting in bed. No new complaints. Méndez draining clear yellow urine. Objective:     Patient Vitals for the past 8 hrs:   BP Temp Pulse Resp SpO2   05/08/18 0544 163/79 97.3 °F (36.3 °C) 61 18 96 %        05/06 1901 - 05/08 0700  In: 4344 [P.O.:290; I.V.:2500]  Out: 1465 [Urine:1165]    Physical Exam:  GENERAL: alert, cooperative, no distress  LUNG: clear to auscultation bilaterally  HEART: regular rate and rhythm, S1, S2   ABDOMEN: soft, non-tender. Active BS. Obese. Dressing c/d/i. No flatus. NEUROLOGIC: AOx3. Data Review   Recent Results (from the past 24 hour(s))   METABOLIC PANEL, BASIC    Collection Time: 05/08/18  5:47 AM   Result Value Ref Range    Sodium 139 136 - 145 mmol/L    Potassium 4.5 3.5 - 5.1 mmol/L    Chloride 103 98 - 107 mmol/L    CO2 27 21 - 32 mmol/L    Anion gap 9 7 - 16 mmol/L    Glucose 123 (H) 65 - 100 mg/dL    BUN 12 8 - 23 MG/DL    Creatinine 1.33 0.8 - 1.5 MG/DL    GFR est AA >60 >60 ml/min/1.73m2    GFR est non-AA 56 (L) >60 ml/min/1.73m2    Calcium 8.5 8.3 - 10.4 MG/DL   CBC W/O DIFF    Collection Time: 05/08/18  5:47 AM   Result Value Ref Range    WBC 12.8 (H) 4.3 - 11.1 K/uL    RBC 4.71 4.23 - 5.67 M/uL    HGB 14.3 13.6 - 17.2 g/dL    HCT 41.8 41.1 - 50.3 %    MCV 88.7 79.6 - 97.8 FL    MCH 30.4 26.1 - 32.9 PG    MCHC 34.2 31.4 - 35.0 g/dL    RDW 13.5 11.9 - 14.6 %    PLATELET 917 418 - 432 K/uL    MPV 9.3 (L) 10.8 - 14.1 FL       Assessment:     Principal Problem:    Left renal mass (5/7/2018)     POD 1 Left laparoscopic nephrectomy. Hgb 14.3  Cn 1.33    Plan:     Continue clear liquids for another day. Continue méndez catheter. Continue IVF. PT/OT consulted- goal is to get up to chair today. Needs encouragement with ambulation. Encourage incentive spirometer. Give dulcolax suppository.     BMP tomorrow AM.     Ene Maciel, NP

## 2018-05-09 PROBLEM — C64.2 CARCINOMA, RENAL CELL, LEFT (HCC): Status: ACTIVE | Noted: 2018-05-09

## 2018-05-09 LAB
ANION GAP SERPL CALC-SCNC: 7 MMOL/L (ref 7–16)
BUN SERPL-MCNC: 12 MG/DL (ref 8–23)
CALCIUM SERPL-MCNC: 8.4 MG/DL (ref 8.3–10.4)
CHLORIDE SERPL-SCNC: 106 MMOL/L (ref 98–107)
CO2 SERPL-SCNC: 27 MMOL/L (ref 21–32)
CREAT SERPL-MCNC: 1.17 MG/DL (ref 0.8–1.5)
GLUCOSE SERPL-MCNC: 134 MG/DL (ref 65–100)
POTASSIUM SERPL-SCNC: 3.9 MMOL/L (ref 3.5–5.1)
SODIUM SERPL-SCNC: 140 MMOL/L (ref 136–145)

## 2018-05-09 PROCEDURE — 74011000302 HC RX REV CODE- 302: Performed by: NURSE PRACTITIONER

## 2018-05-09 PROCEDURE — 74011250637 HC RX REV CODE- 250/637: Performed by: NURSE PRACTITIONER

## 2018-05-09 PROCEDURE — 74011250637 HC RX REV CODE- 250/637: Performed by: UROLOGY

## 2018-05-09 PROCEDURE — 74011000258 HC RX REV CODE- 258: Performed by: NURSE PRACTITIONER

## 2018-05-09 PROCEDURE — 65270000029 HC RM PRIVATE

## 2018-05-09 PROCEDURE — 86580 TB INTRADERMAL TEST: CPT | Performed by: NURSE PRACTITIONER

## 2018-05-09 PROCEDURE — 74011250636 HC RX REV CODE- 250/636: Performed by: UROLOGY

## 2018-05-09 PROCEDURE — 36415 COLL VENOUS BLD VENIPUNCTURE: CPT

## 2018-05-09 PROCEDURE — 80048 BASIC METABOLIC PNL TOTAL CA: CPT

## 2018-05-09 PROCEDURE — 74011000258 HC RX REV CODE- 258: Performed by: UROLOGY

## 2018-05-09 RX ORDER — FACIAL-BODY WIPES
10 EACH TOPICAL ONCE
Status: COMPLETED | OUTPATIENT
Start: 2018-05-09 | End: 2018-05-09

## 2018-05-09 RX ORDER — CHLORPROMAZINE HYDROCHLORIDE 25 MG/1
25 TABLET, FILM COATED ORAL
Status: DISCONTINUED | OUTPATIENT
Start: 2018-05-09 | End: 2018-05-12 | Stop reason: HOSPADM

## 2018-05-09 RX ADMIN — TUBERCULIN PURIFIED PROTEIN DERIVATIVE 5 UNITS: 5 INJECTION, SOLUTION INTRADERMAL at 17:20

## 2018-05-09 RX ADMIN — Medication 10 ML: at 05:06

## 2018-05-09 RX ADMIN — AMITRIPTYLINE HYDROCHLORIDE 25 MG: 50 TABLET, FILM COATED ORAL at 21:27

## 2018-05-09 RX ADMIN — CHLORPROMAZINE HYDROCHLORIDE 25 MG: 25 TABLET, SUGAR COATED ORAL at 19:10

## 2018-05-09 RX ADMIN — DOCUSATE SODIUM 100 MG: 100 CAPSULE, LIQUID FILLED ORAL at 08:51

## 2018-05-09 RX ADMIN — Medication 10 ML: at 21:33

## 2018-05-09 RX ADMIN — FINASTERIDE 5 MG: 5 TABLET, FILM COATED ORAL at 08:51

## 2018-05-09 RX ADMIN — HYDROCODONE BITARTRATE AND ACETAMINOPHEN 1 TABLET: 10; 325 TABLET ORAL at 17:29

## 2018-05-09 RX ADMIN — DOCUSATE SODIUM 100 MG: 100 CAPSULE, LIQUID FILLED ORAL at 17:24

## 2018-05-09 RX ADMIN — BISACODYL 10 MG: 10 SUPPOSITORY RECTAL at 08:51

## 2018-05-09 RX ADMIN — HYDROCODONE BITARTRATE AND ACETAMINOPHEN 1 TABLET: 10; 325 TABLET ORAL at 21:28

## 2018-05-09 RX ADMIN — DEXTROSE MONOHYDRATE AND SODIUM CHLORIDE 100 ML/HR: 5; .45 INJECTION, SOLUTION INTRAVENOUS at 05:30

## 2018-05-09 RX ADMIN — Medication 10 ML: at 14:01

## 2018-05-09 RX ADMIN — DEXTROSE MONOHYDRATE AND SODIUM CHLORIDE 75 ML/HR: 5; .45 INJECTION, SOLUTION INTRAVENOUS at 17:32

## 2018-05-09 RX ADMIN — HYDROCODONE BITARTRATE AND ACETAMINOPHEN 1 TABLET: 10; 325 TABLET ORAL at 05:28

## 2018-05-09 RX ADMIN — HYDROMORPHONE HYDROCHLORIDE 1 MG: 2 INJECTION, SOLUTION INTRAMUSCULAR; INTRAVENOUS; SUBCUTANEOUS at 14:57

## 2018-05-09 NOTE — PROGRESS NOTES
Met with patient and patient's friend Gerald Yoder, [phone]. Patient is agreeable to SNF Rehab at this time. Patient states he would like Adriana Chao to choose facilities to send referrals to. Referrals sent to [facility], and [facility]. Awaiting response. Patient does have 2 daughters one who lives in [facility] [place] [facility] and the other in [place]. According to patient and friend, daughters aren't very involved. Patient lives alone in own home. CM will continue to follow for discharge needs. Care Management Interventions  PCP Verified by CM:  Yes  Mode of Transport at Discharge: BLS  Transition of Care Consult (CM Consult): Discharge Planning, SNF  Discharge Durable Medical Equipment: No  Physical Therapy Consult: Yes  Occupational Therapy Consult: Yes  Current Support Network: Own Home, Lives Alone  Confirm Follow Up Transport: Family  Plan discussed with Pt/Family/Caregiver: Yes  Freedom of Choice Offered: Yes  Discharge Location  Discharge Placement: Skilled nursing facility

## 2018-05-09 NOTE — PROGRESS NOTES
Admit Date: 5/7/2018    Subjective:     Mr. Eneida King is resting in bed, no complaints. Méndez draining yellow urine. He ambulated yesterday with PT. He reports he passed flatus. Abdomen with hypoactive BS and slightly distended. Objective:     Patient Vitals for the past 8 hrs:   BP Temp Pulse Resp SpO2 Weight   05/09/18 0718 142/77 98.4 °F (36.9 °C) 80 17 92 % -   05/09/18 0517 135/58 98.4 °F (36.9 °C) 72 18 90 % 333 lb (151 kg)        05/07 1901 - 05/09 0700  In: 2696 [P.O.:720; I.V.:2578]  Out: 8450 [Urine:8450]    Physical Exam:  GENERAL: alert, cooperative, no distress  LUNG: clear to auscultation bilaterally  HEART: regular rate and rhythm, S1, S2   ABDOMEN: soft, non-tender. Hypoactive BS. Staples c/d/i. NEUROLOGIC: AOx3      Data Review   Recent Results (from the past 24 hour(s))   METABOLIC PANEL, BASIC    Collection Time: 05/09/18  5:15 AM   Result Value Ref Range    Sodium 140 136 - 145 mmol/L    Potassium 3.9 3.5 - 5.1 mmol/L    Chloride 106 98 - 107 mmol/L    CO2 27 21 - 32 mmol/L    Anion gap 7 7 - 16 mmol/L    Glucose 134 (H) 65 - 100 mg/dL    BUN 12 8 - 23 MG/DL    Creatinine 1.17 0.8 - 1.5 MG/DL    GFR est AA >60 >60 ml/min/1.73m2    GFR est non-AA >60 >60 ml/min/1.73m2    Calcium 8.4 8.3 - 10.4 MG/DL       Assessment:     Principal Problem:    Left renal mass (5/7/2018)    POD 2 Left laparoscopic nephrectomy    Cn 1.17    Plan:     Continue clear liquids. Dulcolax suppository. Continue IVF at 75 ml/hr. Remove méndez. Ambulate. Encourage incentive spirometer.     James Welsh, SUMEET

## 2018-05-09 NOTE — PROGRESS NOTES
Pt had 3800 out of méndez catheter. Pt encouraged to use is monitor and encouraged up out of bed. Hourly rounds completed throughout this shift. Pt denies needs at this time. Bed in locked and low position, bedside table within reach. Will continue to monitor and give bedside shift report to oncoming day shift nurse.

## 2018-05-10 ENCOUNTER — APPOINTMENT (OUTPATIENT)
Dept: GENERAL RADIOLOGY | Age: 73
DRG: 657 | End: 2018-05-10
Attending: NURSE PRACTITIONER
Payer: MEDICARE

## 2018-05-10 LAB
ABO + RH BLD: NORMAL
BLD PROD TYP BPU: NORMAL
BLD PROD TYP BPU: NORMAL
BLOOD GROUP ANTIBODIES SERPL: NORMAL
BPU ID: NORMAL
BPU ID: NORMAL
CROSSMATCH RESULT,%XM: NORMAL
CROSSMATCH RESULT,%XM: NORMAL
MM INDURATION POC: NORMAL 0MM (ref 0–5)
PPD POC: NORMAL NEGATIVE
SPECIMEN EXP DATE BLD: NORMAL
STATUS OF UNIT,%ST: NORMAL
STATUS OF UNIT,%ST: NORMAL
UNIT DIVISION, %UDIV: 0
UNIT DIVISION, %UDIV: 0

## 2018-05-10 PROCEDURE — 73100 X-RAY EXAM OF WRIST: CPT

## 2018-05-10 PROCEDURE — 97530 THERAPEUTIC ACTIVITIES: CPT

## 2018-05-10 PROCEDURE — 65270000029 HC RM PRIVATE

## 2018-05-10 PROCEDURE — 74011250636 HC RX REV CODE- 250/636: Performed by: UROLOGY

## 2018-05-10 PROCEDURE — 74011250637 HC RX REV CODE- 250/637: Performed by: NURSE PRACTITIONER

## 2018-05-10 PROCEDURE — 74011250637 HC RX REV CODE- 250/637: Performed by: UROLOGY

## 2018-05-10 RX ORDER — NYSTATIN 100000 [USP'U]/G
POWDER TOPICAL 2 TIMES DAILY
Status: DISCONTINUED | OUTPATIENT
Start: 2018-05-10 | End: 2018-05-12 | Stop reason: HOSPADM

## 2018-05-10 RX ADMIN — CHLORPROMAZINE HYDROCHLORIDE 25 MG: 25 TABLET, SUGAR COATED ORAL at 17:34

## 2018-05-10 RX ADMIN — HYDROCODONE BITARTRATE AND ACETAMINOPHEN 1 TABLET: 10; 325 TABLET ORAL at 17:29

## 2018-05-10 RX ADMIN — Medication 10 ML: at 21:04

## 2018-05-10 RX ADMIN — AMITRIPTYLINE HYDROCHLORIDE 25 MG: 50 TABLET, FILM COATED ORAL at 21:02

## 2018-05-10 RX ADMIN — Medication 10 ML: at 14:07

## 2018-05-10 RX ADMIN — HYDROCODONE BITARTRATE AND ACETAMINOPHEN 1 TABLET: 10; 325 TABLET ORAL at 03:40

## 2018-05-10 RX ADMIN — HYDROMORPHONE HYDROCHLORIDE 1 MG: 2 INJECTION, SOLUTION INTRAMUSCULAR; INTRAVENOUS; SUBCUTANEOUS at 13:55

## 2018-05-10 RX ADMIN — HYDROCODONE BITARTRATE AND ACETAMINOPHEN 1 TABLET: 10; 325 TABLET ORAL at 12:27

## 2018-05-10 RX ADMIN — DOCUSATE SODIUM 100 MG: 100 CAPSULE, LIQUID FILLED ORAL at 08:31

## 2018-05-10 RX ADMIN — HYDROMORPHONE HYDROCHLORIDE 1 MG: 2 INJECTION, SOLUTION INTRAMUSCULAR; INTRAVENOUS; SUBCUTANEOUS at 02:40

## 2018-05-10 RX ADMIN — HYDROCODONE BITARTRATE AND ACETAMINOPHEN 1 TABLET: 10; 325 TABLET ORAL at 21:05

## 2018-05-10 RX ADMIN — HYDROCODONE BITARTRATE AND ACETAMINOPHEN 1 TABLET: 10; 325 TABLET ORAL at 08:33

## 2018-05-10 RX ADMIN — DOCUSATE SODIUM 100 MG: 100 CAPSULE, LIQUID FILLED ORAL at 17:31

## 2018-05-10 RX ADMIN — Medication 10 ML: at 06:31

## 2018-05-10 RX ADMIN — NYSTATIN: 100000 POWDER TOPICAL at 21:02

## 2018-05-10 NOTE — PROGRESS NOTES
Pt 's abdomen distended with bowel sounds hypoactive. Lower abdominal staples pulling apart slightly due to abdominal distention Serasangous  Drainage noted abdominal pad applied to site.

## 2018-05-10 NOTE — PROGRESS NOTES
Patient has been accepted to [facility] rehab. Family and patient notified and have accepted bed offer. 48 hr ppd to be read tomorrow.

## 2018-05-10 NOTE — PROGRESS NOTES
Pt voided 500 ml. Of clear yellow urine incontinent x1 of urine. Hourly rounds completed throughout this shift. Pt denies needs at this time. Bed in locked and low position, bedside table within reach. Will continue to monitor and give bedside shift report to oncoming day shift nurse.

## 2018-05-10 NOTE — PROGRESS NOTES
Problem: Mobility Impaired (Adult and Pediatric)  Goal: *Acute Goals and Plan of Care (Insert Text)  LTG:  (1.)Mr. Josefina Frazier will move from supine to sit and sit to supine, scoot up and down and roll side to side INDEPENDENTLY with bed flat within 7 treatment day(s). (2.)Mr. Josefina Frazier will transfer from bed to chair and chair to bed with MODIFIED INDEPENDENCE using the least restrictive device within 7 treatment day(s). (3.)Mr. Josefina Frazier will ambulate with SUPERVISION for 250+ feet with the least restrictive device within 7 treatment day(s). (4.)Mr. Josefina Frazier will ascend and descend 3 steps with STAND BY ASSIST using handrail within 7 days. ________________________________________________________________________________________________    PHYSICAL THERAPY: Daily Note, Treatment Day: 1st, AM 5/10/2018  INPATIENT: Hospital Day: 4  [place] / Product Type: Medicare /      NAME/AGE/GENDER: Mauricio Lovett is a 75 y.o. male   PRIMARY DIAGNOSIS: Left renal mass [N28.89] Left renal mass Left renal mass  Procedure(s) (LRB):  LEFT NEPHRECTOMY LAPAROSCOPIC (Left)  3 Days Post-Op  ICD-10: Treatment Diagnosis:    · Difficulty in walking, Not elsewhere classified (R26.2)  · Other abnormalities of gait and mobility (R26.89)   Precaution/Allergies:  Review of patient's allergies indicates no known allergies. ASSESSMENT:     Mr. Josefina Frazier is a 75year old male with  left nephrectomy. He presents supine in bed  and is agreeable to therapy treatment. Patient states that his right hand hurts very bad and there is noted swelling. Pt lives alone and is independent at baseline with mobility, ambulation without any DME. Denies falls. Bed mobility requires min assist as patient has decreased use of his right hand due to pain and reported back pain. Patient is able to scoot to get to the edge of the bed. Sitting balance good after set up.   Sit to stand @ walker with min assist.  Balance good once standing. Gait training with rolling walker x 70 feet with contact guard assist.  Patient require a sitting rest break before returning to his room. Patient is noted to be having a bloody discharge from his surgical area. This was reported to the RN. Patient is walked back to his room and returned to the bed. Patient has to navigate the walker with one hand. He required assist with his LE's onto the bed. Positioned for comfort with pillow under right hand. Overall progress good. Patient is making progress. Needs within reach . Bj Guidry is demonstrating expected post op deficits with strength, mobility, and activity tolerance and is limited by pain. He will benefit from continued therapy during acute care stay to address deficits and maximize safety/independence with mobility. May benefit from rehab at discharge due to current deficits. This section established at most recent assessment   PROBLEM LIST (Impairments causing functional limitations):  1. Decreased Strength  2. Decreased ADL/Functional Activities  3. Decreased Transfer Abilities  4. Decreased Ambulation Ability/Technique  5. Decreased Balance  6. Increased Pain  7. Decreased Activity Tolerance   INTERVENTIONS PLANNED: (Benefits and precautions of physical therapy have been discussed with the patient.)  1. Balance Exercise  2. Bed Mobility  3. Gait Training  4. Therapeutic Activites  5. Therapeutic Exercise/Strengthening  6. Transfer Training  7. Group Therapy     TREATMENT PLAN: Frequency/Duration: 3 times a week for duration of hospital stay  Rehabilitation Potential For Stated Goals: Good     RECOMMENDED REHABILITATION/EQUIPMENT: (at time of discharge pending progress): Due to the probability of continued deficits (see above) this patient will likely need continued skilled physical therapy after discharge.   Equipment:    Walkers, Type: Rolling Walker              HISTORY:   History of Present Injury/Illness (Reason for Referral):  Pt s/p left nephrectomy  Past Medical History/Comorbidities:   Mr. Cadence Jimenes  has a past medical history of Adverse effect of anesthesia; Anxiety; Arthritis; Borderline diabetes; CAD (coronary artery disease) (2004); Chronic pain; COPD; Depression; Diverticulitis; Former smoker, stopped smoking in distant past; Heart murmur; History of shingles; [place] (hard of hearing); Hyperlipidemia; Hypertension; Hypertrophy of prostate with urinary obstruction and other lower urinary tract symptoms (LUTS); Impotence of organic origin; Insomnia; Left renal mass; Morbid obesity with BMI of 40.0-44.9, adult ([address] (04/30/2018); PONV (postoperative nausea and vomiting); Stenosis of lumbosacral spine; and Unspecified adverse effect of anesthesia. Mr. Cadence Jimenes  has a past surgical history that includes hx colonoscopy; hx femur fracture tx (Left, 1974, 1975); hx urological; hx urological; pr total knee arthroplasty (Bilateral); hx lumbar laminectomy; and hx cataract removal (Bilateral). Social History/Living Environment:   Home Environment: Private residence  # Steps to Enter: 3  Wheelchair Ramp: No  One/Two Story Residence: One story  Living Alone: Yes  Support Systems: [employer] / bro community, Family member(s)  Patient Expects to be Discharged to[de-identified] Unknown  Current DME Used/Available at Home: Cane, straight  Prior Level of Function/Work/Activity:  Lives alone in single story home with 3 steps. Independent at baseline without DME. Drives. No falls. Has family and [employer] community near by to help.       Number of Personal Factors/Comorbidities that affect the Plan of Care: 1-2: MODERATE COMPLEXITY   EXAMINATION:   Most Recent Physical Functioning:   Gross Assessment:                  Posture:     Balance:  Sitting: Intact (after set up)  Sitting - Static: Good (unsupported)  Sitting - Dynamic: Good (unsupported)  Standing: Impaired  Standing - Static: Fair  Standing - Dynamic : Fair Bed Mobility:  Rolling: Minimum assistance  Supine to Sit: Minimum assistance  Sit to Supine: Minimum assistance  Scooting: Minimum assistance  Wheelchair Mobility:     Transfers:  Sit to Stand: Minimum assistance  Stand to Sit: Contact guard assistance;Minimum assistance  Gait:     Base of Support: Center of gravity altered; Widened  Distance (ft): 70 Feet (ft)  Assistive Device: Gait belt;Walker, rolling  Ambulation - Level of Assistance: Contact guard assistance  Interventions: Safety awareness training      Body Structures Involved:  1. Muscles Body Functions Affected:  1. Sensory/Pain  2. Movement Related Activities and Participation Affected:  1. General Tasks and Demands  2. Mobility  3. Self Care  4. Domestic Life  5. Community, Social and [place]   Number of elements that affect the Plan of Care: 4+: HIGH COMPLEXITY   CLINICAL PRESENTATION:   Presentation: Stable and uncomplicated: LOW COMPLEXITY   CLINICAL DECISION MAKIN [facility] Inpatient Short Form  How much difficulty does the patient currently have. .. Unable A Lot A Little None   1. Turning over in bed (including adjusting bedclothes, sheets and blankets)? [] 1   [] 2   [x] 3   [] 4   2. Sitting down on and standing up from a chair with arms ( e.g., wheelchair, bedside commode, etc.)   [] 1   [x] 2   [] 3   [] 4   3. Moving from lying on back to sitting on the side of the bed? [] 1   [x] 2   [] 3   [] 4   How much help from another person does the patient currently need. .. Total A Lot A Little None   4. Moving to and from a bed to a chair (including a wheelchair)? [] 1   [] 2   [x] 3   [] 4   5. Need to walk in hospital room? [] 1   [] 2   [x] 3   [] 4   6. Climbing 3-5 steps with a railing? [] 1   [x] 2   [] 3   [] 4   © , Trustees of [address] [place] [address] [address] [address], under license to [employer]  All rights reserved      Score:  Initial: 15 Most Recent: X (Date: -- )    Interpretation of Tool:  Represents activities that are increasingly more difficult (i.e. Bed mobility, Transfers, Gait). Score 24 23 22-20 19-15 14-10 9-7 6     Modifier CH CI CJ CK CL CM CN      ? Mobility - Walking and Moving Around:     - CURRENT STATUS: CK - 40%-59% impaired, limited or restricted    - GOAL STATUS: CI - 1%-19% impaired, limited or restricted    - D/C STATUS:  ---------------To be determined---------------  [place] / Product Type: Medicare /      Medical Necessity:     · Patient demonstrates good rehab potential due to higher previous functional level. Reason for Services/Other Comments:  · Patient continues to demonstrate capacity to improve strength, mobility, balance, transfers, activity tolerance which will increase independence and increase safety. Use of outcome tool(s) and clinical judgement create a POC that gives a: Clear prediction of patient's progress: LOW COMPLEXITY            TREATMENT:   (In addition to Assessment/Re-Assessment sessions the following treatments were rendered)   Pre-treatment Symptoms/Complaints:  \"I will try but my right hand really hurts\"  Pain: Initial:   Pain Intensity 1: 10  Pain Location 1: Hand (right)  Post Session:  10/10     Therapeutic Activity: (    32 minutes): Therapeutic activities including bed mobility, sitting balance, sit to stand and gait training to improve mobility, strength, balance and coordination. Required min assist with  Safety awareness training to promote static and dynamic balance in sitting.          Braces/Orthotics/Lines/Etc:   · IV  · O2 Device: Room air  Treatment/Session Assessment:    · Response to Treatment:  Tolerated well  · Interdisciplinary Collaboration:   o Physical Therapy Assistant  o Registered Nurse  · After treatment position/precautions:   o Supine in bed  o Bed/Chair-wheels locked  o Bed in low position  o Call light within reach  o RN notified   · Compliance with Program/Exercises: doing better  · Recommendations/Intent for next treatment session: \"Next visit will focus on advancements to more challenging activities and reduction in assistance provided\".   Total Treatment Duration:  PT Patient Time In/Time Out  Time In: 0943  Time Out: 1015    Erika Ta PTA

## 2018-05-10 NOTE — PROGRESS NOTES
Admit Date: 5/7/2018    Subjective:     Mr. Nam Wong is laying in bed. He has c/o R wrist pain. Per nursing notes, he had some serosanguinous leakage from mid-abdominal incision. He has passed flatus and had a small BM. He is voiding. Objective:     Patient Vitals for the past 8 hrs:   BP Temp Pulse Resp SpO2 Weight   05/10/18 0718 125/68 98.4 °F (36.9 °C) 95 20 90 % -   05/10/18 0400 110/59 98.1 °F (36.7 °C) 91 18 90 % -   05/10/18 0357 - - - - - 329 lb (149.2 kg)        05/08 1901 - 05/10 0700  In: 720 [P.O.:720]  Out: 1231 [Urine:6350]    Physical Exam:  GENERAL: alert, cooperative, no distress  LUNG: clear to auscultation bilaterally  HEART: regular rate and rhythm, S1, S2   ABDOMEN: soft, non-tender. Active BS. Staples intact and currently dry, no drainage expressed at incision site. NEUROLOGIC: AOx3    Data Review No results found for this or any previous visit (from the past 24 hour(s)). Assessment:     Principal Problem:    Left renal mass (5/7/2018)    Active Problems:    Carcinoma, renal cell, left ([address] (5/9/2018)      POD 3 Left laparoscopic nephrectomy    Plan:     Saline-lock IV. Regular diet. Keep abdominal dressing clean and dry, okay to remove if he stays dry. X-ray right wrist.    Continue PT/OT and encourage ambulation. Continue incentive spirometer. SW arranging rehab placement.     Navneet Yap NP

## 2018-05-10 NOTE — PROGRESS NOTES
Dressing over lower abd covering staples was replaced. Had moderate amount of serosanguinous drainage present.

## 2018-05-11 ENCOUNTER — APPOINTMENT (OUTPATIENT)
Dept: CT IMAGING | Age: 73
DRG: 657 | End: 2018-05-11
Attending: UROLOGY
Payer: MEDICARE

## 2018-05-11 LAB
MM INDURATION POC: 0 MM (ref 0–5)
PPD POC: 0 NEGATIVE

## 2018-05-11 PROCEDURE — 74011250637 HC RX REV CODE- 250/637: Performed by: UROLOGY

## 2018-05-11 PROCEDURE — L3809 WHFO W/O JOINTS PRE OTS: HCPCS

## 2018-05-11 PROCEDURE — 74176 CT ABD & PELVIS W/O CONTRAST: CPT

## 2018-05-11 PROCEDURE — 65270000029 HC RM PRIVATE

## 2018-05-11 PROCEDURE — 97535 SELF CARE MNGMENT TRAINING: CPT

## 2018-05-11 PROCEDURE — 74011250636 HC RX REV CODE- 250/636: Performed by: UROLOGY

## 2018-05-11 PROCEDURE — 97530 THERAPEUTIC ACTIVITIES: CPT

## 2018-05-11 RX ADMIN — NYSTATIN: 100000 POWDER TOPICAL at 09:27

## 2018-05-11 RX ADMIN — AMITRIPTYLINE HYDROCHLORIDE 25 MG: 50 TABLET, FILM COATED ORAL at 22:47

## 2018-05-11 RX ADMIN — HYDROMORPHONE HYDROCHLORIDE 1 MG: 2 INJECTION, SOLUTION INTRAMUSCULAR; INTRAVENOUS; SUBCUTANEOUS at 22:49

## 2018-05-11 RX ADMIN — FINASTERIDE 5 MG: 5 TABLET, FILM COATED ORAL at 09:26

## 2018-05-11 RX ADMIN — DOCUSATE SODIUM 100 MG: 100 CAPSULE, LIQUID FILLED ORAL at 18:22

## 2018-05-11 RX ADMIN — NYSTATIN: 100000 POWDER TOPICAL at 18:23

## 2018-05-11 RX ADMIN — Medication 10 ML: at 14:41

## 2018-05-11 RX ADMIN — Medication 10 ML: at 05:10

## 2018-05-11 RX ADMIN — HYDROMORPHONE HYDROCHLORIDE 1 MG: 2 INJECTION, SOLUTION INTRAMUSCULAR; INTRAVENOUS; SUBCUTANEOUS at 05:10

## 2018-05-11 RX ADMIN — HYDROCODONE BITARTRATE AND ACETAMINOPHEN 1 TABLET: 10; 325 TABLET ORAL at 04:18

## 2018-05-11 RX ADMIN — HYDROCODONE BITARTRATE AND ACETAMINOPHEN 1 TABLET: 10; 325 TABLET ORAL at 14:48

## 2018-05-11 RX ADMIN — DOCUSATE SODIUM 100 MG: 100 CAPSULE, LIQUID FILLED ORAL at 09:26

## 2018-05-11 RX ADMIN — HYDROMORPHONE HYDROCHLORIDE 1 MG: 2 INJECTION, SOLUTION INTRAMUSCULAR; INTRAVENOUS; SUBCUTANEOUS at 08:02

## 2018-05-11 RX ADMIN — HYDROCODONE BITARTRATE AND ACETAMINOPHEN 1 TABLET: 10; 325 TABLET ORAL at 19:22

## 2018-05-11 RX ADMIN — Medication 10 ML: at 22:50

## 2018-05-11 NOTE — PROGRESS NOTES
Problem: Self Care Deficits Care Plan (Adult)  Goal: *Acute Goals and Plan of Care (Insert Text)  1. Patient will complete lower body bathing and dressing with minimal assistance and adaptive equipment as needed. 2. Patient will complete toileting with minimal assistance. 3. Patient will tolerate 25 minutes of OT treatment with 2-3 rest breaks to increase activity tolerance for ADLs. 4. Patient will complete functional transfers with supervision and adaptive equipment as needed. 5. Patient will demonstrate ability to complete log roll technique with supine to sit with supervision to decrease pain in abdomen with bed mobility. Timeframe: 7 visits       OCCUPATIONAL THERAPY: Daily Note, Treatment Day: 2nd and AM 5/11/2018  INPATIENT: Hospital Day: 5  [place] / Product Type: Medicare /      NAME/AGE/GENDER: Ayleen London is a 75 y.o. male   PRIMARY DIAGNOSIS:  Left renal mass [N28.89] Left renal mass Left renal mass  Procedure(s) (LRB):  LEFT NEPHRECTOMY LAPAROSCOPIC (Left)  4 Days Post-Op  ICD-10: Treatment Diagnosis:    · Generalized Muscle Weakness (M62.81)  · Other lack of cordination (R27.8)   Precautions/Allergies:     Review of patient's allergies indicates no known allergies. ASSESSMENT:   Mr. Jess Hayes was admitted for the above diagnosis. Pt presents supine upon arrival. Pt was assisted with self care tasks (in grid below) while long sitting in bed and standing. Pt required min/mod assist for mobility and transfers. Limited with tasks due to right arm pain. RN aware. Pt left in chair with all needs in reach. Will continue to benefit from skilled OT during stay. This section established at most recent assessment   PROBLEM LIST (Impairments causing functional limitations):  1. Decreased Strength  2. Decreased ADL/Functional Activities  3. Decreased Transfer Abilities  4. Decreased Ambulation Ability/Technique  5. Decreased Balance  6.  Increased Pain  7. Decreased Activity Tolerance  8. Decreased Flexibility/Joint Mobility  9. Edema/Girth  10. Decreased Knowledge of Precautions  11. Decreased [place] with Home Exercise Program  12. Decreased Cognition   INTERVENTIONS PLANNED: (Benefits and precautions of occupational therapy have been discussed with the patient.)  1. Activities of daily living training  2. Adaptive equipment training  3. Balance training  4. Clothing management  5. Cognitive training  6. Donning&doffing training  7. Neuromuscular re-eduation  8. Therapeutic activity  9. Therapeutic exercise     TREATMENT PLAN: Frequency/Duration: Follow patient 3 times per week to address above goals. Rehabilitation Potential For Stated Goals: Good     RECOMMENDED REHABILITATION/EQUIPMENT: (at time of discharge pending progress): Due to the probability of continued deficits (see above) this patient will likely need continued skilled occupational therapy after discharge. Equipment:    TBD              OCCUPATIONAL PROFILE AND HISTORY:   History of Present Injury/Illness (Reason for Referral):  See H&P  Past Medical History/Comorbidities:   Mr. Oralia Soria  has a past medical history of Adverse effect of anesthesia; Anxiety; Arthritis; Borderline diabetes; CAD (coronary artery disease) (2004); Chronic pain; COPD; Depression; Diverticulitis; Former smoker, stopped smoking in distant past; Heart murmur; History of shingles; [place] (hard of hearing); Hyperlipidemia; Hypertension; Hypertrophy of prostate with urinary obstruction and other lower urinary tract symptoms (LUTS); Impotence of organic origin; Insomnia; Left renal mass; Morbid obesity with BMI of 40.0-44.9, adult ([facility] [address] (04/30/2018); PONV (postoperative nausea and vomiting); Stenosis of lumbosacral spine; and Unspecified adverse effect of anesthesia.   Mr. Oralia Soria  has a past surgical history that includes hx colonoscopy; hx femur fracture tx (Left, 1974, 1975); hx urological; hx urological; pr total knee arthroplasty (Bilateral); hx lumbar laminectomy; and hx cataract removal (Bilateral). Social History/Living Environment:   Home Environment: Private residence  # Steps to Enter: 3  Wheelchair Ramp: No  One/Two Story Residence: One story  Living Alone: Yes  Support Systems: [employer] / bro community, Family member(s)  Patient Expects to be Discharged to[de-identified] Unknown  Current DME Used/Available at Home: Cane, straight  Prior Level of Function/Work/Activity:  Pt lives alone. Pt reports that prior to admission he was independent with AdL/functional mobility. Pt reports no hx of falls. Pt still drives. Personal Factors:          Social Background:  Lives alone. Overall Behavior:  Anxious; impulsive due to pain        Other factors that influence how disability is experienced by the patient:  Multiple co-morbidities    Number of Personal Factors/Comorbidities that affect the Plan of Care: Extensive review of physical, cognitive, and psychosocial performance (3+):  HIGH COMPLEXITY   ASSESSMENT OF OCCUPATIONAL PERFORMANCE[de-identified]   Activities of Daily Living:           Basic ADLs (From Assessment) Complex ADLs (From Assessment)   Feeding: Setup  Oral Facial Hygiene/Grooming: Stand-by assistance  Bathing: Moderate assistance  Upper Body Dressing: Moderate assistance  Lower Body Dressing: Total assistance  Toileting: Total assistance Instrumental ADL  Meal Preparation: Total assistance  Homemaking: Total assistance   Grooming/Bathing/Dressing Activities of Daily Living   Grooming  Washing Face: Supervision/set-up Cognitive Retraining  Safety/Judgement: Awareness of environment   Upper Body Bathing  Bathing Assistance: Minimum assistance; Moderate assistance  Position Performed: Long sitting on bed     Lower Body Bathing  Perineal  : Maximum assistance  Position Performed: Standing  Lower Body : Maximum assistance  Position Performed: Long sitting on bed     Upper [address]: Minimum  assistance Lower Body Dressing Assistance  Socks: Total assistance (dependent) Bed/Mat Mobility  Supine to Sit: Moderate assistance  Sit to Stand: Minimum assistance; Moderate assistance  Bed to Chair: Minimum assistance       Most Recent Physical Functioning:   Gross Assessment:                  Posture:  Posture (WDL): Exceptions to WDL  Posture Assessment: Forward head, Rounded shoulders, Trunk flexion  Balance:  Sitting: Impaired  Sitting - Static: Good (unsupported)  Sitting - Dynamic: Fair (occasional)  Standing: Impaired  Standing - Static: Fair  Standing - Dynamic : Fair (minus) Bed Mobility:  Supine to Sit: Moderate assistance  Wheelchair Mobility:     Transfers:  Sit to Stand: Minimum assistance; Moderate assistance  Bed to Chair: Minimum assistance                Patient Vitals for the past 6 hrs:   BP SpO2 Pulse   18 0727 144/69 - 94   18 1141 159/70 93 % (!) 118       Mental Status  Neurologic State: Alert  Orientation Level: Oriented X4  Cognition: Follows commands  Perception: Appears intact  Perseveration: No perseveration noted  Safety/Judgement: Awareness of environment                          Physical Skills Involved:  1. Range of Motion  2. Balance  3. Strength  4. Activity Tolerance  5. Pain (acute)  6. Edema Cognitive Skills Affected (resulting in the inability to perform in a timely and safe manner):  1. Sustained Attention  2. Divided Attention Psychosocial Skills Affected:  1. Habits/Routines  2. Environmental Adaptation  3. Self-Awareness   Number of elements that affect the Plan of Care: 5+:  HIGH COMPLEXITY   CLINICAL DECISION MAKIN [facility] [address] [id number] AM-PAC 6 Clicks   Daily Activity Inpatient Short Form  How much help from another person does the patient currently need. .. Total A Lot A Little None   1. Putting on and taking off regular lower body clothing? [x] 1   [] 2   [] 3   [] 4   2. Bathing (including washing, rinsing, drying)? [] 1   [x] 2   [] 3   [] 4   3. Toileting, which includes using toilet, bedpan or urinal?   [x] 1   [] 2   [] 3   [] 4   4. Putting on and taking off regular upper body clothing? [] 1   [x] 2   [] 3   [] 4   5. Taking care of personal grooming such as brushing teeth? [] 1   [] 2   [x] 3   [] 4   6. Eating meals? [] 1   [] 2   [x] 3   [] 4   © 2007, Trustees of [address] [place] [address] [address] [address], under license to [employer]. All rights reserved      Score:  Initial: 12 Most Recent: X (Date: -- )    Interpretation of Tool:  Represents activities that are increasingly more difficult (i.e. Bed mobility, Transfers, Gait). Score 24 23 22-20 19-15 14-10 9-7 6     Modifier CH CI CJ CK CL CM CN      ? Self Care:     - CURRENT STATUS: CL - 60%-79% impaired, limited or restricted    - GOAL STATUS: CK - 40%-59% impaired, limited or restricted    - D/C STATUS:  ---------------To be determined---------------  [place] / Product Type: Medicare /      Medical Necessity:     · Patient demonstrates good rehab potential due to higher previous functional level. Reason for Services/Other Comments:  · Patient continues to require skilled intervention due to decreased independence with ADL/functional transfers. Use of outcome tool(s) and clinical judgement create a POC that gives a: MODERATE COMPLEXITY         TREATMENT:   (In addition to Assessment/Re-Assessment sessions the following treatments were rendered)     Pre-treatment Symptoms/Complaints:    Pain: Initial:   Pain Intensity 1: 0  Post Session:  10/10 abdomen       Self Care: (15 minutes): Procedure(s) (per grid) utilized to improve and/or restore self-care/home management as related to dressing, bathing and grooming. Required moderate verbal, manual and tactile cueing to facilitate activities of daily living skills and compensatory activities. Therapeutic Activity: (10 minutes):   Therapeutic activities including Bed/Chair transfers to improve mobility, strength, balance and coordination. Required minimal assistance to promote static and dynamic balance in standing. Braces/Orthotics/Lines/Etc:   · IV  · méndez catheter  · O2 Device: Nasal cannula  Treatment/Session Assessment:    · Response to Treatment:  Pt tolerated with increased pain levels. · Interdisciplinary Collaboration:   o Certified Occupational Therapy Assistant  o Registered Nurse  o Certified Nursing Assistant/Patient Care Technician  · After treatment position/precautions:   o Up in chair  o Bed/Chair-wheels locked  o Call light within reach  o RN notified   · Compliance with Program/Exercises: Will assess as treatment progresses. · Recommendations/Intent for next treatment session: \"Next visit will focus on advancements to more challenging activities and reduction in assistance provided\".   Total Treatment Duration:  OT Patient Time In/Time Out  Time In: 0935  Time Out: [address]

## 2018-05-11 NOTE — CONSULTS
[address]    Violette Medina  MR#: [id number]  : 1945  ACCOUNT #: [de-identified]   DATE OF SERVICE: 2018    REASON FOR CONSULTATION:  Right wrist pain. HISTORY OF PRESENT ILLNESS:  I was asked to see this 54-year-old gentleman for right wrist pain. He is a poor historian. He was admitted with a renal mass. He underwent a nephrectomy for that. He states that he started having pain in his right wrist on the day of admission when he had an IV placed. He appears to be significantly confused as he states that he has been in the hospital for 3 weeks. According to my review, he has only been here for about 4 days. He states that he injured his wrist a couple of years ago when a drill twisted around and hit him in the wrist.  He says that he has had pain off and on since then, but his current pain is a significant increase from his baseline. He says he has a wrist splint at home. He denies numbness or tingling. He is quite hard of hearing and apparently does not know where his hearing aids are. That complicates history taking and examination significantly. Past medical history, past surgical history, family history, medications, allergies, social history and review of systems are reviewed in the electronic medical record. PHYSICAL EXAMINATION:  Demonstrates an elderly gentleman who is quite hard of hearing and in no acute distress. He is apprehensive regarding examination of the right upper extremity. Compartments are soft. Skin is intact. There is some soft tissue swelling diffusely about the right wrist at the level of the radiocarpal joint and carpus. There is no focal mass. Sensation is intact. He will not move his fingers very much. Due to his poor hearing and apprehension regarding exam, it is not clear to me whether this represents a motor deficit or simply not wanting to move his hands due to pain.   There is tenderness to palpation at the level of the radiocarpal joint distally. Palpable radial pulse. No adenopathy. There is slight warmth about the wrist, but no severe increase in temperature. Mild erythema is noted. Radiographs of the right wrist dated 05/10/2018 are reviewed by me today and demonstrate some calcific densities about the wrist as well as scapholunate widening and both radiocarpal and distal radioulnar joint arthritis. 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 I do not think this represents an abscess. All of this is complicated by the patient's apparent confusion and poor hearing. He will be placed into a Velcro wrist splint. I would like to see him on an outpatient basis after he is discharged from the hospital.  It is my understanding that he will be discharged to home. Please feel free to call me if there are any additional orthopedic needs during his hospitalization.       MD Desi Padilla / Serina Kapoor  D: 05/11/2018 13:11     T: 05/11/2018 13:31  JOB #: [id number]

## 2018-05-11 NOTE — PROGRESS NOTES
Admit Date: 5/7/2018    Subjective:     Patient complains of severe R wrist pain and states he can't move wrist.  R ankle is hurting as well. Objective:     Patient Vitals for the past 8 hrs:   BP Temp Pulse Resp SpO2 Weight   05/11/18 0727 144/69 97.6 °F (36.4 °C) 94 20 - -   05/11/18 0518 - 97.8 °F (36.6 °C) - - - -   05/11/18 0420 139/74 97.7 °F (36.5 °C) 91 21 93 % 330 lb 4.8 oz (149.8 kg)        05/09 1901 - 05/11 0700  In: 960 [P.O.:960]  Out: 1154 [Urine:1550]    Physical Exam: rrr, ctab, abd soft with + BS, continued serous output from umbilical extraction site. No visible issue at R wrist or R ankle. Painful to touch. Data Review No results found for this or any previous visit (from the past 24 hour(s)). Assessment:     Principal Problem:    Left renal mass (5/7/2018)    Active Problems:    Carcinoma, renal cell, left (HCC) (5/9/2018)    POD 4 s/p LEFT laparoscopic nephrectomy    Plan:       Orthopedic consult in regards to severe R wrist and now (new overnight) R ankle pain. Wrist X ray done yesterday. CT abd/pelvis without contrast to assess fascia at umbilical kidney extraction site.         Lillian Stevens MD

## 2018-05-11 NOTE — PROGRESS NOTES
Pt confused, taking hearing aides out and not knowing where he put them.  Pt found hearing aides, attitude not as sharp and demanding as earlier today

## 2018-05-11 NOTE — PROGRESS NOTES
Pt elevated in bed for comfort and 4 pillows placed behind neck to achieve comfort for pt. Pt right hand red and states its paralyzed and he can not even move it. Pt has right hand elevated on pillows. Pt states he is a [place] and does not want to fang he just wants hand x-rayed and MD to tell him what is wrong.

## 2018-05-11 NOTE — PROGRESS NOTES
Hourly rounds completed and continuing to monitor the patient. All needs were met and patient is stable. Pt's output was 550 ml this shift, appearance yesenia/clear. Pt c/o 10/10 pain in his right hand and neck. Pt stated, \"I can't move this hand, it's paralyzed. \" Pt moved his right hand minimally and used his left hand to move his right hand when needed. The R hand is warmer than his left. Pt was given norco X 2 and dilaudid this am.   Pt's abdominal dressing was changed three times this shift, last drsg change was at 0530 this morning, drainage color was serosanguinous/yellow. Pt has been encouraged to ambulate with assistance. Will continue to monitor and report to oncoming nurse.

## 2018-05-11 NOTE — PROGRESS NOTES
Abdominal dressing saturated with bloody drainage. Pt cleansed and new dressing with gauze and tape applied.

## 2018-05-11 NOTE — PROGRESS NOTES
Pt's abd dressing has been saturated twice this shift. Staples still intact, clean, and dry. Pt's abd staples now dressed with an abd pad and tape. Will continue to monitor at this time.

## 2018-05-12 VITALS
HEART RATE: 75 BPM | WEIGHT: 315 LBS | HEIGHT: 74 IN | RESPIRATION RATE: 20 BRPM | TEMPERATURE: 98.4 F | OXYGEN SATURATION: 96 % | DIASTOLIC BLOOD PRESSURE: 67 MMHG | SYSTOLIC BLOOD PRESSURE: 106 MMHG | BODY MASS INDEX: 40.43 KG/M2

## 2018-05-12 LAB
ANION GAP SERPL CALC-SCNC: 9 MMOL/L (ref 7–16)
BUN SERPL-MCNC: 19 MG/DL (ref 8–23)
CALCIUM SERPL-MCNC: 8.8 MG/DL (ref 8.3–10.4)
CHLORIDE SERPL-SCNC: 103 MMOL/L (ref 98–107)
CO2 SERPL-SCNC: 27 MMOL/L (ref 21–32)
CREAT SERPL-MCNC: 1.3 MG/DL (ref 0.8–1.5)
ERYTHROCYTE [DISTWIDTH] IN BLOOD BY AUTOMATED COUNT: 13.3 % (ref 11.9–14.6)
GLUCOSE SERPL-MCNC: 98 MG/DL (ref 65–100)
HCT VFR BLD AUTO: 41 % (ref 41.1–50.3)
HGB BLD-MCNC: 13.9 G/DL (ref 13.6–17.2)
MCH RBC QN AUTO: 30.3 PG (ref 26.1–32.9)
MCHC RBC AUTO-ENTMCNC: 33.9 G/DL (ref 31.4–35)
MCV RBC AUTO: 89.5 FL (ref 79.6–97.8)
PLATELET # BLD AUTO: 174 K/UL (ref 150–450)
PMV BLD AUTO: 9.3 FL (ref 10.8–14.1)
POTASSIUM SERPL-SCNC: 4.1 MMOL/L (ref 3.5–5.1)
RBC # BLD AUTO: 4.58 M/UL (ref 4.23–5.67)
SODIUM SERPL-SCNC: 139 MMOL/L (ref 136–145)
WBC # BLD AUTO: 9.8 K/UL (ref 4.3–11.1)

## 2018-05-12 PROCEDURE — 74011250637 HC RX REV CODE- 250/637: Performed by: UROLOGY

## 2018-05-12 PROCEDURE — 85027 COMPLETE CBC AUTOMATED: CPT | Performed by: UROLOGY

## 2018-05-12 PROCEDURE — 36415 COLL VENOUS BLD VENIPUNCTURE: CPT | Performed by: UROLOGY

## 2018-05-12 PROCEDURE — 80048 BASIC METABOLIC PNL TOTAL CA: CPT | Performed by: UROLOGY

## 2018-05-12 RX ORDER — HYDROCODONE BITARTRATE AND ACETAMINOPHEN 10; 325 MG/1; MG/1
1 TABLET ORAL
Qty: 20 TAB | Refills: 0 | Status: SHIPPED | OUTPATIENT
Start: 2018-05-12 | End: 2018-07-11

## 2018-05-12 RX ADMIN — HYDROCODONE BITARTRATE AND ACETAMINOPHEN 1 TABLET: 10; 325 TABLET ORAL at 09:03

## 2018-05-12 RX ADMIN — NYSTATIN: 100000 POWDER TOPICAL at 09:03

## 2018-05-12 RX ADMIN — DOCUSATE SODIUM 100 MG: 100 CAPSULE, LIQUID FILLED ORAL at 09:00

## 2018-05-12 RX ADMIN — FINASTERIDE 5 MG: 5 TABLET, FILM COATED ORAL at 09:01

## 2018-05-12 RX ADMIN — Medication 10 ML: at 05:13

## 2018-05-12 NOTE — PROGRESS NOTES
Hourly rounds completed. Patient with an uneventful shift. VSS. Abdominal dressing C,D, I.  Patient states no further needs at this time. Bed low and locked with call light within reach. Will continue to monitor until day shift nurse takes over.

## 2018-05-12 NOTE — PROGRESS NOTES
Admit Date: 5/7/2018    Subjective:     Patient has no new complaint. Eating well and has had BM. Wrist feels better in brace. Objective:     Patient Vitals for the past 8 hrs:   BP Temp Pulse Resp SpO2 Weight   05/12/18 0803 106/67 98.4 °F (36.9 °C) 75 20 96 % -   05/12/18 0425 138/63 98.3 °F (36.8 °C) 81 20 94 % 328 lb 12.8 oz (149.1 kg)        05/10 1901 - 05/12 0700  In: 600 [P.O.:600]  Out: 850 [Urine:850]    Physical Exam: rrr, ctab, abd soft, incisions c/d/i, extraction site dressing currently dry        Data Review   Recent Results (from the past 24 hour(s))   PLEASE READ & DOCUMENT PPD TEST IN 48 HRS    Collection Time: 05/11/18  4:47 PM   Result Value Ref Range    PPD 0 Negative    mm Induration 0 mm   CBC W/O DIFF    Collection Time: 05/12/18  5:56 AM   Result Value Ref Range    WBC 9.8 4.3 - 11.1 K/uL    RBC 4.58 4.23 - 5.67 M/uL    HGB 13.9 13.6 - 17.2 g/dL    HCT 41.0 (L) 41.1 - 50.3 %    MCV 89.5 79.6 - 97.8 FL    MCH 30.3 26.1 - 32.9 PG    MCHC 33.9 31.4 - 35.0 g/dL    RDW 13.3 11.9 - 14.6 %    PLATELET 187 400 - 384 K/uL    MPV 9.3 (L) 10.8 - 10.8 FL   METABOLIC PANEL, BASIC    Collection Time: 05/12/18  5:56 AM   Result Value Ref Range    Sodium 139 136 - 145 mmol/L    Potassium 4.1 3.5 - 5.1 mmol/L    Chloride 103 98 - 107 mmol/L    CO2 27 21 - 32 mmol/L    Anion gap 9 7 - 16 mmol/L    Glucose 98 65 - 100 mg/dL    BUN 19 8 - 23 MG/DL    Creatinine 1.30 0.8 - 1.5 MG/DL    GFR est AA >60 >60 ml/min/1.73m2    GFR est non-AA 57 (L) >60 ml/min/1.73m2    Calcium 8.8 8.3 - 10.4 MG/DL           Assessment:     Principal Problem:    Left renal mass (5/7/2018)    Active Problems:    Carcinoma, renal cell, left ([address] (5/9/2018)        Plan:     CT yesterday revealed fat containing hernia at umbilical extraction site incision. Images were reviewed with Dr. Glenis Harrell, General surgery. As NO bowel involved and NO abdominal pain, NO surgical intervention currently warranted.   He may continue to have serous output from incision for a week or two and that would be ok. Wrist is better. Thanks to orthopedics.       To rehab today with follow up late next week or early next for staple removal.        Shahla Matias MD

## 2018-05-12 NOTE — PROGRESS NOTES
ROBIN met with patient and informed him that he was being d/c to [facility] today. Patient was informed that [facility] had been contacted and will be here before pick-up time. Patient requested that another friend \"Osmin Whiting\". ROBIN made contact with Mr. Loi Almeida and informed him that patient was being d/c today to [facility] rehab. Care Management Interventions  PCP Verified by CM:  Yes  Mode of Transport at Discharge: BLS  Transition of Care Consult (CM Consult): Discharge Planning, SNF  Discharge Durable Medical Equipment: No  Physical Therapy Consult: Yes  Occupational Therapy Consult: Yes  Current Support Network: Own Home, Lives Alone  Confirm Follow Up Transport: Family  Plan discussed with Pt/Family/Caregiver: Yes  Freedom of Choice Offered: Yes  Discharge Location  Discharge Placement: Skilled nursing facility

## 2018-05-12 NOTE — PROGRESS NOTES
Report given to Billie at [facility]. Pickup time between 12:15-12:30 today. Will continue to monitor.

## 2018-05-12 NOTE — PROGRESS NOTES
Problem: Falls - Risk of  Goal: *Absence of Falls  Document Yovani Fall Risk and appropriate interventions in the flowsheet. Outcome: Progressing Towards Goal  Fall Risk Interventions:  Mobility Interventions: Patient to call before getting OOB    Mentation Interventions: Adequate sleep, hydration, pain control, Increase mobility, More frequent rounding, Toileting rounds, Update white board    Medication Interventions: Assess postural VS orthostatic hypotension, Evaluate medications/consider consulting pharmacy, Patient to call before getting OOB, Teach patient to arise slowly    Elimination Interventions: Call light in reach, Patient to call for help with toileting needs, Toileting schedule/hourly rounds             Problem: Pressure Injury - Risk of  Goal: *Prevention of pressure injury  Document Phuc Scale and appropriate interventions in the flowsheet.    Outcome: Progressing Towards Goal  Pressure Injury Interventions:  Sensory Interventions: Assess changes in LOC, Minimize linen layers, Check visual cues for pain    Moisture Interventions: Absorbent underpads, Minimize layers, Moisture barrier, Limit adult briefs, Check for incontinence Q2 hours and as needed    Activity Interventions: Increase time out of bed, Pressure redistribution bed/mattress(bed type), PT/OT evaluation    Mobility Interventions: HOB 30 degrees or less, Pressure redistribution bed/mattress (bed type), PT/OT evaluation    Nutrition Interventions: Document food/fluid/supplement intake    Friction and Shear Interventions: Minimize layers, HOB 30 degrees or less, Apply protective barrier, creams and emollients

## 2018-05-12 NOTE — DISCHARGE SUMMARY
Discharge Summary     Patient: Berto Wise MRN: [id number]  SSN: [id number]    YOB: 1945  Age: 75 y.o. Sex: male      Allergies: Review of patient's allergies indicates no known allergies. Admit Date: 5/7/2018    Discharge Date: 5/12/2018     * Admission Diagnoses:  Left renal mass [N28.89]     * Discharge Diagnoses:   Hospital Problems as of 5/12/2018  Date Reviewed: 4/23/2018          Codes Class Noted - Resolved POA    Carcinoma, renal cell, left ([facility] [address] ICD-10-CM: C64.2  ICD-9-CM: 189.0  5/9/2018 - Present Unknown        * (Principal)Left renal mass ICD-10-CM: N28.89  ICD-9-CM: 593.9  5/7/2018 - Present Yes               * Procedures for this admission:   Procedure(s):  LEFT NEPHRECTOMY LAPAROSCOPIC      * Disposition: to rehab facility    * Discharged Condition: good    * Hospital Course: To OR day of admission for LEFT laparoscopic nephrectomy. Complained post operatively of RIGHT wrist pain and milder R ankle pain. X rays obtained of R wrist and orthopedic consult obtained. Wrist brace applied, NO follow up or intervention needed. He had mild serous output from umbilical extraction site incision. CT performed 5/11/18 with fat containing hernia at the site, NO bowel involved. General surgery did NOT recommend surgical intervention. Serous drainage from incision for up to 2 weeks would be OK. Eating well, has had BM. Labwork looks good. Discharge Medications:   Current Discharge Medication List      START taking these medications    Details   HYDROcodone-acetaminophen (NORCO)  mg tablet Take 1 Tab by mouth every six (6) hours as needed. Max Daily Amount: 4 Tabs. Qty: 20 Tab, Refills: 0    Associated Diagnoses: Carcinoma, renal cell, left ([facility] [address]         CONTINUE these medications which have NOT CHANGED    Details   glycerin/propylene glycol (LUBRICANT EYE DROPS, GLYC-PG, OP) Apply 1 Drop to eye two (2) times a day.       amitriptyline (ELAVIL) 25 mg tablet Take  by mouth nightly. finasteride (PROSCAR) 5 mg tablet Take 1 Tab by mouth daily. Qty: 90 Tab, Refills: 3      acetaminophen (TYLENOL) 650 mg TbER Take 650 mg by mouth every eight (8) hours. albuterol (PROAIR HFA) 90 mcg/actuation inhaler Take 1 Puff by inhalation every six (6) hours as needed for Wheezing or Shortness of Breath (\"seldom uses\" per pt). Ibuprofen-diphenhydrAMINE (ADVIL PM) 200-38 mg tab Take  by mouth. STOP taking these medications       oxybutynin (DITROPAN) 5 mg tablet Comments:   Reason for Stopping:                * Follow-up Care/Patient Instructions:   Activity: Activity as tolerated  Diet: Regular Diet  Wound Care: Keep wound clean and dry and Reinforce dressing PRN    FOLLOW UP:   Office will call with follow up for staple removal.         Signed By: Joellen Hogue MD     May 12, 2018

## 2018-05-12 NOTE — DISCHARGE INSTRUCTIONS
DISCHARGE SUMMARY from Nurse    PATIENT INSTRUCTIONS:    After general anesthesia or intravenous sedation, for 24 hours or while taking prescription Narcotics:  · Limit your activities  · Do not drive and operate hazardous machinery  · Do not make important personal or business decisions  · Do  not drink alcoholic beverages  · If you have not urinated within 8 hours after discharge, please contact your surgeon on call. Report the following to your surgeon:  · Excessive pain, swelling, redness or odor of or around the surgical area  · Temperature over 100.5  · Nausea and vomiting lasting longer than 4 hours or if unable to take medications  · Any signs of decreased circulation or nerve impairment to extremity: change in color, persistent  numbness, tingling, coldness or increase pain  · Any questions        *  Please give a list of your current medications to your Primary Care Provider. *  Please update this list whenever your medications are discontinued, doses are      changed, or new medications (including over-the-counter products) are added. *  Please carry medication information at all times in case of emergency situations. These are general instructions for a healthy lifestyle:    No smoking/ No tobacco products/ Avoid exposure to second hand smoke  Surgeon General's Warning:  Quitting smoking now greatly reduces serious risk to your health. Obesity, smoking, and sedentary lifestyle greatly increases your risk for illness    A healthy diet, regular physical exercise & weight monitoring are important for maintaining a healthy lifestyle    You may be retaining fluid if you have a history of heart failure or if you experience any of the following symptoms:  Weight gain of 3 pounds or more overnight or 5 pounds in a week, increased swelling in our hands or feet or shortness of breath while lying flat in bed.   Please call your doctor as soon as you notice any of these symptoms; do not wait until your next office visit. Recognize signs and symptoms of STROKE:    F-face looks uneven    A-arms unable to move or move unevenly    S-speech slurred or non-existent    T-time-call 911 as soon as signs and symptoms begin-DO NOT go       Back to bed or wait to see if you get better-TIME IS BRAIN. Warning Signs of HEART ATTACK     Call 911 if you have these symptoms:   Chest discomfort. Most heart attacks involve discomfort in the center of the chest that lasts more than a few minutes, or that goes away and comes back. It can feel like uncomfortable pressure, squeezing, fullness, or pain.  Discomfort in other areas of the upper body. Symptoms can include pain or discomfort in one or both arms, the back, neck, jaw, or stomach.  Shortness of breath with or without chest discomfort.  Other signs may include breaking out in a cold sweat, nausea, or lightheadedness. Don't wait more than five minutes to call 911 - MINUTES MATTER! Fast action can save your life. Calling 911 is almost always the fastest way to get lifesaving treatment. Emergency Medical Services staff can begin treatment when they arrive -- up to an hour sooner than if someone gets to the hospital by car. The discharge information has been reviewed with the patient. The patient verbalized understanding. Discharge medications reviewed with the patient and appropriate educational materials and side effects teaching were provided. ___________________________________________________________________________________________________________________________________     Open Nephrectomy: What to Expect at Home  Your Recovery  A nephrectomy is surgery to take out part or all of the kidney. One or both kidneys may have been taken out. Sometimes other tissue near the kidney is taken out at the same time. The doctor took out your kidney through a long cut, called an incision, in the front or side of your belly.  The incision will leave a scar that will fade with time. Your belly will feel sore after the surgery. This usually lasts about 1 to 2 weeks. Your doctor will give you pain medicine for this. You may also have other symptoms such as nausea, diarrhea, constipation, gas, or a headache. At first, you may have low energy and get tired quickly. It may take 3 to 6 months for your energy to fully return. Your body can work fine with one healthy kidney. If both kidneys are removed or your remaining kidney is not healthy, your doctor will talk to you about the kind of treatment you will need after surgery. This care sheet gives you a general idea about how long it will take for you to recover. But each person recovers at a different pace. Follow the steps below to get better as quickly as possible. How can you care for yourself at home? Activity  ? · Rest when you feel tired. Getting enough sleep will help you recover. ? · Try to walk each day. Start by walking a little more than you did the day before. Bit by bit, increase the amount you walk. Walking boosts blood flow and helps prevent pneumonia and constipation. ? · Avoid exercises that use your belly muscles and strenuous activities, such as bicycle riding, jogging, weight lifting, or aerobic exercise, until your doctor says it is okay. ? · For at least 4 weeks, avoid lifting anything that would make you strain. This may include a child, heavy grocery bags and milk containers, a heavy briefcase or backpack, cat litter or dog food bags, or a vacuum . ? · Hold a pillow over the cut the doctor made (incision) when you cough or take deep breaths. This will support your belly and decrease your pain. ? · Do breathing exercises at home as instructed by your doctor. This will help prevent pneumonia. ? · Ask your doctor when you can drive again. ? · You will probably need to take 4 to 6 weeks off from work. It depends on the type of work you do and how you feel.    ? · You may be able to take showers (unless you have a drainage tube near your incision). If you have a drainage tube, follow your doctor's instructions to empty and care for it. Do not take a bath for the first 2 weeks, or until your doctor tells you it is okay. ? · Ask your doctor when it is okay for you to have sex. Diet  ? · You can eat your normal diet. If you were on a special diet for your kidneys before surgery, follow that diet until your doctor tells you to stop. ? · If your stomach is upset, try bland, low-fat foods like plain rice, broiled chicken, toast, and yogurt. ? · Drink plenty of fluids (unless your doctor tells you not to). ? · You may notice that your bowel movements are not regular right after your surgery. This is common. Try to avoid constipation and straining with bowel movements. You may want to take a fiber supplement every day. If you have not had a bowel movement after a couple of days, ask your doctor about taking a mild laxative. Medicines  ? · Your doctor will tell you if and when you can restart your medicines. He or she will also give you instructions about taking any new medicines. ? · If you take blood thinners, such as warfarin (Coumadin), clopidogrel (Plavix), or aspirin, be sure to talk to your doctor. He or she will tell you if and when to start taking those medicines again. Make sure that you understand exactly what your doctor wants you to do. ? · Take pain medicines exactly as directed. ¨ If the doctor gave you a prescription medicine for pain, take it as prescribed. ¨ If you are not taking a prescription pain medicine, take an over-the-counter medicine that your doctor recommends. Read and follow all instructions on the label. ¨ Do not take aspirin, ibuprofen (Advil, Motrin), naproxen (Aleve), or other nonsteroidal anti-inflammatory drugs (NSAIDs) unless your doctor says it is okay.    ? · If you think your pain medicine is making you sick to your stomach:  ¨ Take your medicine after meals (unless your doctor has told you not to). ¨ Ask your doctor for a different pain medicine. ? · If your doctor prescribed antibiotics, take them as directed. Do not stop taking them just because you feel better. You need to take the full course of antibiotics. Incision care  ? · If you have strips of tape on the incision, leave the tape on for a week or until it falls off.   ? · Wash the area daily with warm, soapy water and pat it dry. Don't use hydrogen peroxide or alcohol, which can slow healing. You may cover the area with a gauze bandage if it weeps or rubs against clothing. Change the bandage every day. ? · Keep the area clean and dry. Follow-up care is a key part of your treatment and safety. Be sure to make and go to all appointments, and call your doctor if you are having problems. It's also a good idea to know your test results and keep a list of the medicines you take. When should you call for help? Call 911 anytime you think you may need emergency care. For example, call if:  ? · You passed out (lost consciousness). ? · You have chest pain, are short of breath, or cough up blood. ?Call your doctor now or seek immediate medical care if:  ? · You have pain that does not get better after you take your pain medicine. ? · You have symptoms of a urinary tract infection. These may include:  ¨ Pain or burning when you urinate. ¨ A frequent need to urinate without being able to pass much urine. ¨ Pain in the flank, which is just below the rib cage and above the waist on either side of the back. ¨ Blood in the urine. ¨ A fever. ? · You have signs of infection, such as:  ¨ Increased pain, swelling, warmth, or redness. ¨ Red streaks leading from the incisions. ¨ Pus draining from the incisions. ¨ A fever. ? · You have loose stitches, or your incisions come open. ? · You are bleeding from the incisions. ? · You cannot urinate.    ? · You are sick to your stomach or cannot drink fluids. ? · You have signs of a blood clot in your leg (called a deep vein thrombosis), such as:  ¨ Pain in the calf, back of the knee, thigh, or groin. ¨ Redness and swelling in your leg. ? Watch closely for changes in your health, and be sure to contact your doctor if you are having any problems. Where can you learn more? Go to http://matthew-maame.info/. Enter A982 in the search box to learn more about \"Open Nephrectomy: What to Expect at Home. \"  Current as of: May 12, 2017  Content Version: 11.4  © 6500-4292 [employer]. Care instructions adapted under license by [employer] (which disclaims liability or warranty for this information). If you have questions about a medical condition or this instruction, always ask your healthcare professional. [address] any warranty or liability for your use of this information.

## (undated) DEVICE — SYR LR LCK 1ML GRAD NSAF 30ML --

## (undated) DEVICE — POUCH SPEC RETRV SHFT 15MM 13X23CM GRN POLYUR DBL RNG HNDL

## (undated) DEVICE — STANDARD HYPODERMIC NEEDLE,POLYPROPYLENE HUB: Brand: MONOJECT

## (undated) DEVICE — SUTURE VCRL SZ 0 L54IN ABSRB UD POLYGLACTIN 910 COAT BRAID J608H

## (undated) DEVICE — SUTURE VCRL SZ 0 L54IN ABSRB UD LIGAPAK REEL NDL J287G

## (undated) DEVICE — GOWN SURG REINF TWL 2XL STRL --

## (undated) DEVICE — SOLUTION IRRIG 3000ML 0.9% SOD CHL FLX CONT 0797208] ICU MEDICAL INC]

## (undated) DEVICE — Device

## (undated) DEVICE — LOGICUT SCISSOR LENGTH 320MM: Brand: LOGI - LAPAROSCOPIC INSTRUMENT SYSTEM

## (undated) DEVICE — 2000CC GUARDIAN II: Brand: GUARDIAN

## (undated) DEVICE — BLADELESS OPTICAL TROCAR WITH FIXATION CANNULA: Brand: VERSAONE

## (undated) DEVICE — REM POLYHESIVE ADULT PATIENT RETURN ELECTRODE: Brand: VALLEYLAB

## (undated) DEVICE — VISUALIZATION SYSTEM: Brand: CLEARIFY

## (undated) DEVICE — SUTURE PDS II SZ 0 L27IN ABSRB VLT L36MM CT-1 1/2 CIR Z340H

## (undated) DEVICE — KENDALL SCD EXPRESS SLEEVES, KNEE LENGTH, MEDIUM: Brand: KENDALL SCD

## (undated) DEVICE — NEEDLE INSUF L120MM ULT VERES ENDOPATH

## (undated) DEVICE — DRAPE TWL SURG 16X26IN BLU ORB04] ALLCARE INC]

## (undated) DEVICE — ARTICULATION RELOAD WITH TRI-STAPLE TECHNOLOGY: Brand: ENDO GIA

## (undated) DEVICE — WARMER SCP LAP

## (undated) DEVICE — CLIP APPLIER: Brand: ENDO CLIP II

## (undated) DEVICE — UNIVERSAL FIXATION CANNULA: Brand: VERSAONE

## (undated) DEVICE — 2, DISPOSABLE SUCTION/IRRIGATOR WITHOUT DISPOSABLE TIP: Brand: STRYKEFLOW

## (undated) DEVICE — (D)PREP SKN CHLRAPRP APPL 26ML -- CONVERT TO ITEM 371833

## (undated) DEVICE — CONTAINER SPEC HISTOLOGY 900ML POLYPR

## (undated) DEVICE — SPONGE LAP 18X18IN STRL -- 5/PK

## (undated) DEVICE — UNIVERSAL STAPLER: Brand: ENDO GIA ULTRA

## (undated) DEVICE — TROCAR ENDOSCP L100MM DIA15MM BLDELSS STBL SL ENDOPATH XCEL

## (undated) DEVICE — BUTTON SWITCH PENCIL BLADE ELECTRODE, HOLSTER: Brand: EDGE

## (undated) DEVICE — SLIM BODY SKIN STAPLER: Brand: APPOSE ULC

## (undated) DEVICE — ARTICULATING RELOAD WITH TRI-STAPLE TECHNOLOGY: Brand: ENDO GIA

## (undated) DEVICE — LAP CHOLE: Brand: MEDLINE INDUSTRIES, INC.

## (undated) DEVICE — TROCAR: Brand: KII FIOS FIRST ENTRY

## (undated) DEVICE — [HIGH FLOW INSUFFLATOR,  DO NOT USE IF PACKAGE IS DAMAGED,  KEEP DRY,  KEEP AWAY FROM SUNLIGHT,  PROTECT FROM HEAT AND RADIOACTIVE SOURCES.]: Brand: PNEUMOSURE

## (undated) DEVICE — SHEARS ENDOSCP L36CM DIA5MM ULTRASONIC CRV TIP W/ ADV

## (undated) DEVICE — CATH URETH FOL CDE TIP 2W 16FR --

## (undated) DEVICE — GOWN,REINFORCED,POLY,AURORA,XXLARGE,STR: Brand: MEDLINE

## (undated) DEVICE — AMD ANTIMICROBIAL GAUZE SPONGES,12 PLY USP TYPE VII, 0.2% POLYHEXAMETHYLENE BIGUANIDE HCI (PHMB): Brand: CURITY

## (undated) DEVICE — FOLEY TRAY DRAINAGE BG 16 FR ANTI REFLX CHMBR COMPLETE CARE